# Patient Record
Sex: FEMALE | Race: BLACK OR AFRICAN AMERICAN | NOT HISPANIC OR LATINO | RURAL
[De-identification: names, ages, dates, MRNs, and addresses within clinical notes are randomized per-mention and may not be internally consistent; named-entity substitution may affect disease eponyms.]

---

## 2022-06-30 DIAGNOSIS — M25.521 RIGHT ELBOW PAIN: Primary | ICD-10-CM

## 2022-06-30 DIAGNOSIS — M25.511 RIGHT SHOULDER PAIN, UNSPECIFIED CHRONICITY: ICD-10-CM

## 2022-07-05 ENCOUNTER — HOSPITAL ENCOUNTER (OUTPATIENT)
Dept: RADIOLOGY | Facility: HOSPITAL | Age: 47
Discharge: HOME | End: 2022-07-05
Attending: ORTHOPAEDIC SURGERY
Payer: MEDICAID

## 2022-07-05 ENCOUNTER — OFFICE VISIT (OUTPATIENT)
Dept: ORTHOPEDICS | Facility: CLINIC | Age: 47
End: 2022-07-05
Payer: MEDICAID

## 2022-07-05 DIAGNOSIS — M25.511 RIGHT SHOULDER PAIN, UNSPECIFIED CHRONICITY: ICD-10-CM

## 2022-07-05 DIAGNOSIS — M77.11 LATERAL EPICONDYLITIS OF RIGHT ELBOW: ICD-10-CM

## 2022-07-05 DIAGNOSIS — M65.311 TRIGGER FINGER OF RIGHT THUMB: ICD-10-CM

## 2022-07-05 DIAGNOSIS — M75.40 IMPINGEMENT SYNDROME OF SHOULDER REGION, UNSPECIFIED LATERALITY: Primary | ICD-10-CM

## 2022-07-05 DIAGNOSIS — M25.521 RIGHT ELBOW PAIN: ICD-10-CM

## 2022-07-05 DIAGNOSIS — M75.00 ADHESIVE CAPSULITIS OF SHOULDER, UNSPECIFIED LATERALITY: ICD-10-CM

## 2022-07-05 PROCEDURE — 73070 X-RAY EXAM OF ELBOW: CPT | Mod: 26,RT,, | Performed by: ORTHOPAEDIC SURGERY

## 2022-07-05 PROCEDURE — 73030 XR SHOULDER COMPLETE 2 OR MORE VIEWS RIGHT: ICD-10-PCS | Mod: 26,RT,, | Performed by: ORTHOPAEDIC SURGERY

## 2022-07-05 PROCEDURE — 99204 PR OFFICE/OUTPT VISIT, NEW, LEVL IV, 45-59 MIN: ICD-10-PCS | Mod: 25,,, | Performed by: ORTHOPAEDIC SURGERY

## 2022-07-05 PROCEDURE — 73070 X-RAY EXAM OF ELBOW: CPT | Mod: TC,RT

## 2022-07-05 PROCEDURE — 20550 INTERMEDIATE JOINT ASPIRATION/INJECTION: ICD-10-PCS | Mod: 59,RT,, | Performed by: ORTHOPAEDIC SURGERY

## 2022-07-05 PROCEDURE — 99204 OFFICE O/P NEW MOD 45 MIN: CPT | Mod: 25,,, | Performed by: ORTHOPAEDIC SURGERY

## 2022-07-05 PROCEDURE — 73070 XR ELBOW 2 VIEWS RIGHT: ICD-10-PCS | Mod: 26,RT,, | Performed by: ORTHOPAEDIC SURGERY

## 2022-07-05 PROCEDURE — 20610 LARGE JOINT ASPIRATION/INJECTION: R SUBACROMIAL BURSA: ICD-10-PCS | Mod: RT,,, | Performed by: ORTHOPAEDIC SURGERY

## 2022-07-05 PROCEDURE — 20610 DRAIN/INJ JOINT/BURSA W/O US: CPT | Mod: RT,,, | Performed by: ORTHOPAEDIC SURGERY

## 2022-07-05 PROCEDURE — 20550 NJX 1 TENDON SHEATH/LIGAMENT: CPT | Mod: 59,RT,, | Performed by: ORTHOPAEDIC SURGERY

## 2022-07-05 PROCEDURE — 73030 X-RAY EXAM OF SHOULDER: CPT | Mod: 26,RT,, | Performed by: ORTHOPAEDIC SURGERY

## 2022-07-05 PROCEDURE — 73030 X-RAY EXAM OF SHOULDER: CPT | Mod: TC,RT

## 2022-07-05 RX ORDER — TRIAMCINOLONE ACETONIDE 40 MG/ML
80 INJECTION, SUSPENSION INTRA-ARTICULAR; INTRAMUSCULAR
Status: DISCONTINUED | OUTPATIENT
Start: 2022-07-05 | End: 2022-07-05 | Stop reason: HOSPADM

## 2022-07-05 RX ORDER — TRIAMCINOLONE ACETONIDE 40 MG/ML
40 INJECTION, SUSPENSION INTRA-ARTICULAR; INTRAMUSCULAR
Status: DISCONTINUED | OUTPATIENT
Start: 2022-07-05 | End: 2022-07-05 | Stop reason: HOSPADM

## 2022-07-05 RX ADMIN — TRIAMCINOLONE ACETONIDE 40 MG: 40 INJECTION, SUSPENSION INTRA-ARTICULAR; INTRAMUSCULAR at 09:07

## 2022-07-05 RX ADMIN — TRIAMCINOLONE ACETONIDE 80 MG: 40 INJECTION, SUSPENSION INTRA-ARTICULAR; INTRAMUSCULAR at 09:07

## 2022-07-05 NOTE — PROGRESS NOTES
HPI:   Arti Kaur is a pleasant 46 y.o. patient who reports to clinic for evaluation of right shoulder and right elbow pain. Pt reports she has had pain for 2 months. Reports her pain is anteriorly based. She has pain on the lateral side of her elbow. Hurts to pick anything up.   Injury onset and description: None  Patient's occupation: [employer] (she is a [employer] vet)  This is not a work related injury.   she has not had formal physical therapy  she has not had previous shoulder injections.   she has not had advanced imaging such as MRI.   The shoulder pain worsens with activity and overhead motion. Pain is disruptive to sleep at night. The pain is better with rest. Treatment thus far has included rest and activity modification. Here today to discuss diagnosis and treatment options.   VAS Pain Scale:  7      PAST MEDICAL HISTORY:   No past medical history on file.  PAST SURGICAL HISTORY:   No past surgical history on file.  MEDICATIONS:  No current outpatient medications on file.  ALLERGIES:   Review of patient's allergies indicates:  Not on File  REVIEW OF SYSTEMS:  Constitution: Negative. Negative for chills, fever and night sweats. HENT: Negative for congestion and headaches.  Eyes: Negative for blurred vision, left vision loss and right vision loss. Cardiovascular: Negative for chest pain and syncope. Respiratory: Negative for cough and shortness of breath.  Endocrine: Negative for polydipsia, polyphagia and polyuria. Hematologic/Lymphatic: Negative for bleeding problem. Does not bruise/bleed easily. Skin: Negative for dry skin, itching and rash.   Musculoskeletal: Negative for falls. Positive for hand pain and muscle weakness.     PHYSICAL EXAM:  VITAL SIGNS: There were no vitals taken for this visit.  General: Well-developed well-nourished 46 y.o. femalein no acute distress;Cardiovascular: Regular rhythm by palpation of distal pulse, normal color and temperature, no concerning  varicosities on symptomatic side Lungs: No labored breathing or wheezing appreciated Neuro: Alert and oriented ×3 Psychiatric: well oriented to person, place and time, demonstrates normal mood and affect Skin: No rashes, lesions or ulcers, normal temperature, turgor, and texture on uninvolved extremity    General    Nursing note and vitals reviewed.  Constitutional: She is oriented to person, place, and time. She appears well-developed and well-nourished. No distress.   HENT:   Head: Normocephalic and atraumatic.   Nose: Nose normal.   Eyes: EOM are normal. Pupils are equal, round, and reactive to light.   Neck: Neck supple.   Cardiovascular: Normal rate and intact distal pulses.    Pulmonary/Chest: Effort normal and breath sounds normal. No respiratory distress. She exhibits no tenderness.   Abdominal: Soft. Bowel sounds are normal. She exhibits no distension. There is no abdominal tenderness.   Neurological: She is alert and oriented to person, place, and time. She has normal reflexes. She displays normal reflexes. No cranial nerve deficit. She exhibits normal muscle tone.   Psychiatric: She has a normal mood and affect. Her behavior is normal. Judgment and thought content normal.             Right Hand/Wrist Exam     Inspection   Scars: Wrist - absent     Range of Motion     Wrist   Extension: normal   Flexion: normal     Other     Neuorologic Exam    Median Distribution: normal  Ulnar Distribution: normal  Radial Distribution: normal      Right Elbow Exam     Inspection   Scars: absent  Bruising: absent  Atrophy: absent    Pain   The patient exhibits pain of the extensor musculature and lateral epicondyle    Range of Motion   Extension: normal   Flexion: normal   Pronation: normal   Supination: normal     Tests   Varus: negative  Valgus: negative  Tennis Elbow: severe  Golfer's Elbow: negative  Radial Capitellar Grind: negative    Other   Sensation: normal      Right Shoulder Exam     Inspection/Observation    Swelling: absent  Bruising: absent  Scapular Dyskinesia: positive    Tenderness   The patient is tender to palpation of the greater tuberosity, acromion and acromioclavicular joint.    Range of Motion   Active abduction: abnormal   Passive abduction: abnormal   Forward Flexion: abnormal   Forward Elevation: abnormal  External Rotation 90 degrees: normal    Tests & Signs   Apprehension: negative  Cross arm: positive  Drop arm: negative  Vila test: positive  Impingement: positive  Rotator Cuff Painful Arc/Range: mild  Lag Sign 90 degrees: negative  Lift Off Sign: positive  Belly Press: positive  Active Compression Test (Timblin's Sign): positive  Jerk Test: negative    Other   Sensation: normal    Comments:  Pain with dynamic labral shear test.  There is pain and weakness with Whipple testing.     Left Shoulder Exam   Left shoulder exam is normal.      Muscle Strength   Right Upper Extremity   Shoulder External Rotation: 4/5   Supraspinatus: 4/5   Subscapularis: 4/5   Biceps: 4/5   Wrist extension: 4/5   Wrist flexion: 5/5   Elbow Pronation:  5/5   Elbow Supination:  5/5   Elbow Extension: 5/5  Elbow Flexion: 5/5    Reflexes     Right Side   Biceps:  2+  Right Smith's Sign:  absent    Vascular Exam     Right Pulses      Radial:                    2+      Capillary Refill  Right Hand: normal capillary refill            IMAGING:  X-ray Shoulder 2 or More Views Right  Radiographs the right shoulder obtained today demonstrating mild glenohumeral osteoarthritis and moderate AC joint osteoarthritis with no evidence of fracture dislocation or pathologic bone.    X-Ray Elbow 2 Views Right    Right elbow radiographs obtained today demonstrate no evidence of fracture dislocation or pathologic bone.    ASSESSMENT:      ICD-10-CM ICD-9-CM   1. Impingement syndrome of shoulder region, unspecified laterality  M75.40 726.2   2. Adhesive capsulitis of shoulder, unspecified laterality  M75.00 726.0   3. Lateral epicondylitis of  right elbow  M77.11 726.32   4. Trigger finger of right thumb  M65.311 727.03       PLAN:     -Findings and treatment options were discussed with the patient  -All questions answered    Also has a right trigger thumb thumb.  Recommend voltaren gel OTC  PT right shoulder   RTC in 4 weeks.  If no better, consider MRI of shoulder  There are no Patient Instructions on file for this visit.  No orders of the defined types were placed in this encounter.    R lateral epicondyleIntermediate Joint Aspiration/Injection    Date/Time: 7/5/2022 9:30 AM  Performed by: Jhony Pabon MD  Authorized by: Jhony Pabon MD     Indications: Pain  Site marked: The procedure site was marked      Location:  Elbow  Needle size:  22 G  Medications:  40 mg triamcinolone acetonide 40 mg/mL  Patient tolerance:  Patient tolerated the procedure well with no immediate complications      Large Joint Aspiration/Injection: R subacromial bursa    Date/Time: 7/5/2022 9:30 AM  Performed by: Jhony Pabon MD  Authorized by: Jhony Pabon MD     Consent Done?:  Yes (Verbal)  Indications:  Pain  Site marked: the procedure site was marked    Local anesthetic:  Bupivacaine 0.25% without epinephrine (4 cc's of 0.25% bupivicaine)    Details:  Needle Size:  22 G  Approach:  Posterior  Location:  Shoulder  Site:  R subacromial bursa  Medications:  80 mg triamcinolone acetonide 40 mg/mL  Patient tolerance:  Patient tolerated the procedure well with no immediate complications

## 2022-10-18 ENCOUNTER — OUTSIDE PLACE OF SERVICE (OUTPATIENT)
Dept: CARDIOLOGY | Facility: HOSPITAL | Age: 47
End: 2022-10-18
Payer: MEDICAID

## 2022-10-18 PROCEDURE — 93010 PR ELECTROCARDIOGRAM REPORT: ICD-10-PCS | Mod: ,,, | Performed by: INTERNAL MEDICINE

## 2022-10-18 PROCEDURE — 93010 ELECTROCARDIOGRAM REPORT: CPT | Mod: ,,, | Performed by: INTERNAL MEDICINE

## 2023-07-12 DIAGNOSIS — M77.11 LATERAL EPICONDYLITIS OF RIGHT ELBOW: Primary | ICD-10-CM

## 2023-07-12 DIAGNOSIS — M79.641 RIGHT HAND PAIN: ICD-10-CM

## 2023-07-12 DIAGNOSIS — M25.521 RIGHT ELBOW PAIN: ICD-10-CM

## 2023-07-24 ENCOUNTER — HOSPITAL ENCOUNTER (OUTPATIENT)
Dept: RADIOLOGY | Facility: HOSPITAL | Age: 48
Discharge: HOME | End: 2023-07-24
Attending: NURSE PRACTITIONER
Payer: MEDICAID

## 2023-07-24 ENCOUNTER — OFFICE VISIT (OUTPATIENT)
Dept: ORTHOPEDICS | Facility: CLINIC | Age: 48
End: 2023-07-24
Payer: MEDICAID

## 2023-07-24 VITALS — BODY MASS INDEX: 32.93 KG/M2 | WEIGHT: 230 LBS | HEIGHT: 70 IN

## 2023-07-24 DIAGNOSIS — M65.341 TRIGGER RING FINGER OF RIGHT HAND: ICD-10-CM

## 2023-07-24 DIAGNOSIS — M79.641 RIGHT HAND PAIN: ICD-10-CM

## 2023-07-24 DIAGNOSIS — M25.521 RIGHT ELBOW PAIN: ICD-10-CM

## 2023-07-24 DIAGNOSIS — M65.311 TRIGGER FINGER OF RIGHT THUMB: ICD-10-CM

## 2023-07-24 DIAGNOSIS — M77.11 LATERAL EPICONDYLITIS OF RIGHT ELBOW: Primary | ICD-10-CM

## 2023-07-24 PROCEDURE — 73070 X-RAY EXAM OF ELBOW: CPT | Mod: TC,RT

## 2023-07-24 PROCEDURE — 73130 X-RAY EXAM OF HAND: CPT | Mod: TC,RT

## 2023-07-24 PROCEDURE — 20605 DRAIN/INJ JOINT/BURSA W/O US: CPT | Mod: PBBFAC,RT | Performed by: NURSE PRACTITIONER

## 2023-07-24 PROCEDURE — 73070 XR ELBOW 2 VIEWS RIGHT: ICD-10-PCS | Mod: 26,RT,, | Performed by: RADIOLOGY

## 2023-07-24 PROCEDURE — 73130 X-RAY EXAM OF HAND: CPT | Mod: 26,RT,, | Performed by: RADIOLOGY

## 2023-07-24 PROCEDURE — 99213 OFFICE O/P EST LOW 20 MIN: CPT | Mod: S$PBB,25,, | Performed by: NURSE PRACTITIONER

## 2023-07-24 PROCEDURE — 20605 INTERMEDIATE JOINT ASPIRATION/INJECTION: ICD-10-PCS | Mod: S$PBB,RT,, | Performed by: NURSE PRACTITIONER

## 2023-07-24 PROCEDURE — 3008F BODY MASS INDEX DOCD: CPT | Mod: CPTII,,, | Performed by: NURSE PRACTITIONER

## 2023-07-24 PROCEDURE — 73070 X-RAY EXAM OF ELBOW: CPT | Mod: 26,RT,, | Performed by: RADIOLOGY

## 2023-07-24 PROCEDURE — 99213 OFFICE O/P EST LOW 20 MIN: CPT | Mod: PBBFAC,25 | Performed by: NURSE PRACTITIONER

## 2023-07-24 PROCEDURE — 73130 XR HAND COMPLETE 3 VIEW RIGHT: ICD-10-PCS | Mod: 26,RT,, | Performed by: RADIOLOGY

## 2023-07-24 PROCEDURE — 99213 PR OFFICE/OUTPT VISIT, EST, LEVL III, 20-29 MIN: ICD-10-PCS | Mod: S$PBB,25,, | Performed by: NURSE PRACTITIONER

## 2023-07-24 PROCEDURE — 3008F PR BODY MASS INDEX (BMI) DOCUMENTED: ICD-10-PCS | Mod: CPTII,,, | Performed by: NURSE PRACTITIONER

## 2023-07-24 RX ORDER — METFORMIN HYDROCHLORIDE 500 MG/1
500 TABLET, EXTENDED RELEASE ORAL
COMMUNITY
Start: 2022-04-03

## 2023-07-24 RX ORDER — VENLAFAXINE HYDROCHLORIDE 75 MG/1
1 CAPSULE, EXTENDED RELEASE ORAL DAILY
COMMUNITY
Start: 2022-03-16

## 2023-07-24 RX ORDER — CHOLECALCIFEROL (VITAMIN D3) 25 MCG
50 TABLET ORAL
COMMUNITY
Start: 2022-04-03

## 2023-07-24 RX ORDER — BUPROPION HYDROCHLORIDE 300 MG/1
1 TABLET ORAL DAILY
COMMUNITY
Start: 2022-03-16

## 2023-07-24 RX ORDER — LANOLIN ALCOHOL/MO/W.PET/CERES
1000 CREAM (GRAM) TOPICAL
COMMUNITY
Start: 2022-04-03

## 2023-07-24 RX ORDER — TRIAMCINOLONE ACETONIDE 40 MG/ML
40 INJECTION, SUSPENSION INTRA-ARTICULAR; INTRAMUSCULAR
Status: DISCONTINUED | OUTPATIENT
Start: 2023-07-24 | End: 2023-07-24 | Stop reason: HOSPADM

## 2023-07-24 RX ORDER — PRAZOSIN HYDROCHLORIDE 2 MG/1
2 CAPSULE ORAL NIGHTLY
COMMUNITY
Start: 2022-03-16

## 2023-07-24 RX ORDER — HYDROXYZINE HYDROCHLORIDE 25 MG/1
25 TABLET, FILM COATED ORAL
COMMUNITY
Start: 2022-05-02

## 2023-07-24 RX ORDER — MIRTAZAPINE 30 MG/1
30 TABLET, FILM COATED ORAL
COMMUNITY
Start: 2022-03-16

## 2023-07-24 RX ORDER — METHYLPREDNISOLONE 4 MG/1
TABLET ORAL
Qty: 21 EACH | Refills: 0 | Status: SHIPPED | OUTPATIENT
Start: 2023-07-24 | End: 2023-08-14

## 2023-07-24 RX ORDER — CYCLOSPORINE 0.5 MG/ML
1 EMULSION OPHTHALMIC 2 TIMES DAILY
COMMUNITY
Start: 2023-06-09

## 2023-07-24 RX ORDER — OMEPRAZOLE 20 MG/1
20 CAPSULE, DELAYED RELEASE ORAL
COMMUNITY
Start: 2022-03-26

## 2023-07-24 RX ORDER — BUSPIRONE HYDROCHLORIDE 10 MG/1
20 TABLET ORAL
COMMUNITY
Start: 2022-03-16

## 2023-07-24 RX ADMIN — TRIAMCINOLONE ACETONIDE 40 MG: 40 INJECTION, SUSPENSION INTRA-ARTICULAR; INTRAMUSCULAR at 01:07

## 2023-07-24 NOTE — PROGRESS NOTES
47 y.o. Female returns to clinic for a follow up visit regarding     ICD-10-CM ICD-9-CM   1. Lateral epicondylitis of right elbow  M77.11 726.32        Patient received an injection in her elbow about a year ago. She states the injection relieved her pain for about 4 months.   She is not having pain in her elbow and wrist. She reports that the pain and limited ROM is interfering with her normal daily activities. She is very tender over lateral side of her elbow.  Also complaining of right thumb and ring finger trigger fingers.  Reports she is ready to discuss surgical options at this time.         No past medical history on file.  No past surgical history on file.      PHYSICAL EXAMINATION:    General    Nursing note and vitals reviewed.  Constitutional: She is oriented to person, place, and time. She appears well-developed and well-nourished.   HENT:   Head: Normocephalic and atraumatic.   Nose: Nose normal.   Eyes: EOM are normal. Pupils are equal, round, and reactive to light.   Neck: Neck supple.   Cardiovascular:  Normal rate and intact distal pulses.            Pulmonary/Chest: Effort normal. No respiratory distress. She exhibits no tenderness.   Abdominal: Soft. She exhibits no distension. There is no abdominal tenderness.   Neurological: She is alert and oriented to person, place, and time. She has normal reflexes.   Psychiatric: She has a normal mood and affect. Her behavior is normal. Judgment and thought content normal.             Right Hand/Wrist Exam     Inspection   Scars: Wrist - absent     Range of Motion     Wrist   Extension:  normal   Flexion:  normal     Other     Neuorologic Exam    Median Distribution: normal  Ulnar Distribution: normal  Radial Distribution: normal      Right Elbow Exam     Inspection   Scars: absent  Bruising: absent  Atrophy: absent    Pain   The patient exhibits pain of the extensor musculature and lateral epicondyle    Range of Motion   Extension:  normal   Flexion:   normal   Pronation:  normal   Supination:  normal     Tests   Varus: negative  Valgus: negative  Tennis Elbow: severe  Golfer's Elbow: negative  Radial Capitellar Grind: negative    Other   Sensation: normal          Muscle Strength   Right Upper Extremity   Wrist extension: 4/5   Wrist flexion: 5/5   Elbow Pronation:  5/5   Elbow Supination:  5/5   Elbow Extension: 5/5  Elbow Flexion: 5/5      IMAGING:  X-Ray Elbow 2 Views Right    Result Date: 7/24/2023  EXAMINATION: XR ELBOW 2 VIEWS RIGHT CLINICAL HISTORY: Pain in right elbow COMPARISON: 5 July 2022 TECHNIQUE: XR ELBOW 2 VIEWS RIGHT FINDINGS: No evidence of fracture seen.  The alignment of the joints appears normal.  No degenerative change is present.  No soft tissue abnormality is seen.     No evidence of abnormality demonstrated Electronically signed by: Juwan Rueda Date:    07/24/2023 Time:    13:58    X-Ray Hand 3 View Right    Result Date: 7/24/2023  EXAMINATION: XR HAND COMPLETE 3 VIEW RIGHT CLINICAL HISTORY: Pain in right hand COMPARISON: None available TECHNIQUE: XR HAND 3 VIEW RIGHT FINDINGS: No evidence of fracture seen.  The alignment of the joints appears normal.  Mild diffuse wrist and hand degenerative change is present.  No soft tissue abnormality is seen.     Mild osteoarthrosis. Electronically signed by: Juwan Rueda Date:    07/24/2023 Time:    13:59       ASSESSMENT:      ICD-10-CM ICD-9-CM   1. Lateral epicondylitis of right elbow  M77.11 726.32       PLAN:     -Findings and treatment options were discussed with the patient  -All questions answered    Elbow injection today.  Medrol Dosepak.  Will have her return to clinic with Dr. Booker in 2-3 weeks to discuss trigger finger release right thumb and ring fingers.    There are no Patient Instructions on file for this visit.      No orders of the defined types were placed in this encounter.        R lateral epicondyleIntermediate Joint Aspiration/Injection    Date/Time: 7/24/2023 1:40  PM  Performed by: TAMAR Buenrostro  Authorized by: TAMAR Buenrostro     Indications:  Pain  Site marked: The procedure site was marked      Location:  Elbow  Needle size:  22 G  Medications:  40 mg triamcinolone acetonide 40 mg/mL  Patient tolerance:  Patient tolerated the procedure well with no immediate complications

## 2023-08-10 ENCOUNTER — OFFICE VISIT (OUTPATIENT)
Dept: ORTHOPEDICS | Facility: CLINIC | Age: 48
End: 2023-08-10
Payer: MEDICAID

## 2023-08-10 DIAGNOSIS — M77.11 LATERAL EPICONDYLITIS OF RIGHT ELBOW: Primary | ICD-10-CM

## 2023-08-10 DIAGNOSIS — M65.311 TRIGGER FINGER OF RIGHT THUMB: ICD-10-CM

## 2023-08-10 DIAGNOSIS — M65.341 TRIGGER RING FINGER OF RIGHT HAND: ICD-10-CM

## 2023-08-10 DIAGNOSIS — M25.529 ELBOW PAIN, UNSPECIFIED LATERALITY: ICD-10-CM

## 2023-08-10 PROCEDURE — 99213 OFFICE O/P EST LOW 20 MIN: CPT | Mod: S$PBB,,, | Performed by: ORTHOPAEDIC SURGERY

## 2023-08-10 PROCEDURE — 99212 OFFICE O/P EST SF 10 MIN: CPT | Mod: PBBFAC | Performed by: ORTHOPAEDIC SURGERY

## 2023-08-10 PROCEDURE — 99213 PR OFFICE/OUTPT VISIT, EST, LEVL III, 20-29 MIN: ICD-10-PCS | Mod: S$PBB,,, | Performed by: ORTHOPAEDIC SURGERY

## 2023-08-10 RX ORDER — DIVALPROEX SODIUM 250 MG/1
3 TABLET, FILM COATED, EXTENDED RELEASE ORAL NIGHTLY
COMMUNITY
Start: 2022-11-30 | End: 2023-12-01

## 2023-08-10 NOTE — PROGRESS NOTES
ASSESSMENT:      ICD-10-CM ICD-9-CM   1. Lateral epicondylitis of right elbow  M77.11 726.32   2. Trigger finger of right thumb  M65.311 727.03   3. Trigger ring finger of right hand  M65.341 727.03       PLAN:     -Findings and treatment options were discussed with the patient  -All questions answered  ***    There are no Patient Instructions on file for this visit.    IMAGING:  X-Ray Hand 3 View Right    Result Date: 7/24/2023  EXAMINATION: XR HAND COMPLETE 3 VIEW RIGHT CLINICAL HISTORY: Pain in right hand COMPARISON: None available TECHNIQUE: XR HAND 3 VIEW RIGHT FINDINGS: No evidence of fracture seen.  The alignment of the joints appears normal.  Mild diffuse wrist and hand degenerative change is present.  No soft tissue abnormality is seen.     Mild osteoarthrosis. Electronically signed by: Juwan Rueda Date:    07/24/2023 Time:    13:59    X-Ray Elbow 2 Views Right    Result Date: 7/24/2023  EXAMINATION: XR ELBOW 2 VIEWS RIGHT CLINICAL HISTORY: Pain in right elbow COMPARISON: 5 July 2022 TECHNIQUE: XR ELBOW 2 VIEWS RIGHT FINDINGS: No evidence of fracture seen.  The alignment of the joints appears normal.  No degenerative change is present.  No soft tissue abnormality is seen.     No evidence of abnormality demonstrated Electronically signed by: Juwan Rueda Date:    07/24/2023 Time:    13:58   ***                  CC:  Hand/wrist pain    47 y.o. Female returns to clinic for a follow up visit regarding her right tennis elbow and trigger fingers. She was seen and given an injection by Gretchen Garrido for her tennis elbow. She got about a week's worth of relief from the injection and the steroid pack. Her right thumb and her right index finger lock and catch daily.           REVIEW OF SYSTEMS:   Constitution: Negative. Negative for chills, fever and night sweats.    Hematologic/Lymphatic: Negative for bleeding problem. Does not bruise/bleed easily.   Skin: Negative for dry skin, itching and rash.    Musculoskeletal: Negative for falls. Positive for hand/wrist pain and muscle weakness.     All other review of symptoms were reviewed and found to be noncontributory.     PAST MEDICAL HISTORY:   Past Medical History:   Diagnosis Date    Anxiety disorder, unspecified     Hypertension     Major depressive disorder, single episode, unspecified        PAST SURGICAL HISTORY:   Past Surgical History:   Procedure Laterality Date    APPENDECTOMY N/A     HEEL SPUR SURGERY Right     LAPAROSCOPIC CHOLECYSTECTOMY N/A     REPAIR OF URETER      TOTAL ABDOMINAL HYSTERECTOMY W/ BILATERAL SALPINGOOPHORECTOMY         FAMILY HISTORY:   Family History   Problem Relation Age of Onset    Diabetes Father     Hypertension Father     Heart disease Father        SOCIAL HISTORY:   Social History     Socioeconomic History    Marital status: Single   Tobacco Use    Smoking status: Never    Smokeless tobacco: Never   Substance and Sexual Activity    Alcohol use: Yes     Comment: occasional       MEDICATIONS:     Current Outpatient Medications:     divalproex ER (DEPAKOTE ER) 250 MG 24 hr tablet, Take 3 tablets by mouth every evening., Disp: , Rfl:     buPROPion (WELLBUTRIN XL) 300 MG 24 hr tablet, Take 1 tablet by mouth once daily., Disp: , Rfl:     busPIRone (BUSPAR) 10 MG tablet, Take 20 mg by mouth., Disp: , Rfl:     cyanocobalamin (VITAMIN B-12) 1000 MCG tablet, Take 1,000 mcg by mouth., Disp: , Rfl:     hydrOXYzine HCL (ATARAX) 25 MG tablet, Take 25 mg by mouth., Disp: , Rfl:     metFORMIN (GLUCOPHAGE-XR) 500 MG ER 24hr tablet, Take 500 mg by mouth., Disp: , Rfl:     methylPREDNISolone (MEDROL DOSEPACK) 4 mg tablet, use as directed, Disp: 21 each, Rfl: 0    mirtazapine (REMERON) 30 MG tablet, Take 30 mg by mouth., Disp: , Rfl:     omeprazole (PRILOSEC) 20 MG capsule, Take 20 mg by mouth., Disp: , Rfl:     prazosin (MINIPRESS) 2 MG Cap, Take 2 capsules by mouth nightly., Disp: , Rfl:     RESTASIS 0.05 % ophthalmic emulsion, Place 1 drop  into both eyes 2 (two) times daily., Disp: , Rfl:     venlafaxine (EFFEXOR-XR) 75 MG 24 hr capsule, Take 1 capsule by mouth once daily., Disp: , Rfl:     vitamin D (VITAMIN D3) 1000 units Tab, Take 50 mcg by mouth., Disp: , Rfl:     ALLERGIES:   Review of patient's allergies indicates:   Allergen Reactions    Aspirin         PHYSICAL EXAMINATION:  There were no vitals taken for this visit.  Ortho/SPM Exam        No orders of the defined types were placed in this encounter.      Procedures

## 2023-08-10 NOTE — PROGRESS NOTES
47 y.o. Female returns to clinic for a follow up visit regarding     ICD-10-CM ICD-9-CM   1. Lateral epicondylitis of right elbow  M77.11 726.32   2. Trigger finger of right thumb  M65.311 727.03   3. Trigger ring finger of right hand  M65.341 727.03        States that her injections in her elbow only lasted about a week a piece.  Continues to complain of debilitating triggering involving the ring finger of her right hand.  She is tried topical Voltaren and anti-inflammatories limited success       Past Medical History:   Diagnosis Date    Anxiety disorder, unspecified     Hypertension     Major depressive disorder, single episode, unspecified      Past Surgical History:   Procedure Laterality Date    APPENDECTOMY N/A     HEEL SPUR SURGERY Right     LAPAROSCOPIC CHOLECYSTECTOMY N/A     REPAIR OF URETER      TOTAL ABDOMINAL HYSTERECTOMY W/ BILATERAL SALPINGOOPHORECTOMY           PHYSICAL EXAMINATION:    General    Nursing note and vitals reviewed.  Constitutional: She is oriented to person, place, and time. She appears well-developed and well-nourished.   HENT:   Head: Normocephalic and atraumatic.   Nose: Nose normal.   Eyes: EOM are normal. Pupils are equal, round, and reactive to light.   Neck: Neck supple.   Cardiovascular:  Normal rate and intact distal pulses.            Pulmonary/Chest: Effort normal. No respiratory distress. She exhibits no tenderness.   Abdominal: Soft. She exhibits no distension. There is no abdominal tenderness.   Neurological: She is alert and oriented to person, place, and time. She has normal reflexes.   Psychiatric: She has a normal mood and affect. Her behavior is normal. Judgment and thought content normal.             Right Hand/Wrist Exam     Inspection   Scars: Wrist - absent     Range of Motion     Wrist   Extension:  normal   Flexion:  normal     Other     Neuorologic Exam    Median Distribution: normal  Ulnar Distribution: normal  Radial Distribution: normal      Right Elbow  Exam     Inspection   Scars: absent  Bruising: absent  Atrophy: absent    Pain   The patient exhibits pain of the extensor musculature and lateral epicondyle    Range of Motion   Extension:  normal   Flexion:  normal   Pronation:  normal   Supination:  normal     Tests   Varus: negative  Valgus: negative  Tennis Elbow: severe  Golfer's Elbow: negative  Radial Capitellar Grind: negative    Other   Sensation: normal          Muscle Strength   Right Upper Extremity   Wrist extension: 4/5   Wrist flexion: 5/5   Elbow Pronation:  5/5   Elbow Supination:  5/5   Elbow Extension: 5/5  Elbow Flexion: 5/5    The right hand was inspected today there is point tenderness of the A1 pulley of the thumb with there was no active triggering seen.  There is active triggering however of the ring finger and tenderness to palpation of the A1 pulley there.  Slight triggering is also seen at the ring finger.  Exquisite tenderness once again present at the lateral epicondyle.    IMAGING:  X-Ray Hand 3 View Right    Result Date: 7/24/2023  EXAMINATION: XR HAND COMPLETE 3 VIEW RIGHT CLINICAL HISTORY: Pain in right hand COMPARISON: None available TECHNIQUE: XR HAND 3 VIEW RIGHT FINDINGS: No evidence of fracture seen.  The alignment of the joints appears normal.  Mild diffuse wrist and hand degenerative change is present.  No soft tissue abnormality is seen.     Mild osteoarthrosis. Electronically signed by: Juwan Rueda Date:    07/24/2023 Time:    13:59    X-Ray Elbow 2 Views Right    Result Date: 7/24/2023  EXAMINATION: XR ELBOW 2 VIEWS RIGHT CLINICAL HISTORY: Pain in right elbow COMPARISON: 5 July 2022 TECHNIQUE: XR ELBOW 2 VIEWS RIGHT FINDINGS: No evidence of fracture seen.  The alignment of the joints appears normal.  No degenerative change is present.  No soft tissue abnormality is seen.     No evidence of abnormality demonstrated Electronically signed by: Juwan Rueda Date:    07/24/2023 Time:    13:58       ASSESSMENT:       ICD-10-CM ICD-9-CM   1. Lateral epicondylitis of right elbow  M77.11 726.32   2. Trigger finger of right thumb  M65.311 727.03   3. Trigger ring finger of right hand  M65.341 727.03       PLAN:     -Findings and treatment options were discussed with the patient  -All questions answered      She is now had 2 injections along the right epicondyle and has seen improvement but it only lasted about a week.  Concern here for a tear of the extensor carpi radialis brevis tendon so I am going to order an MRI now given her failure to improve with conservative measures and rest.  Could also be a candidate for trigger finger release but let us get the MRI 1st before we schedule any surgical intervention.    There are no Patient Instructions on file for this visit.      No orders of the defined types were placed in this encounter.        Procedures

## 2023-08-30 ENCOUNTER — TELEPHONE (OUTPATIENT)
Dept: ORTHOPEDICS | Facility: CLINIC | Age: 48
End: 2023-08-30
Payer: MEDICAID

## 2023-08-30 NOTE — TELEPHONE ENCOUNTER
PATIENT NOTIFIED OF RESULTS; FOLLOW-UP APPOINTMENT SCHEDULED    ----- Message from Daily Odonnell sent at 8/30/2023  2:31 PM CDT -----  Pt wants MRI results from last week. Please call [phone].

## 2023-09-12 ENCOUNTER — OFFICE VISIT (OUTPATIENT)
Dept: ORTHOPEDICS | Facility: CLINIC | Age: 48
End: 2023-09-12
Payer: MEDICAID

## 2023-09-12 DIAGNOSIS — Z01.818 PREPROCEDURAL EXAMINATION: ICD-10-CM

## 2023-09-12 DIAGNOSIS — M77.11 LATERAL EPICONDYLITIS OF RIGHT ELBOW: Primary | ICD-10-CM

## 2023-09-12 PROCEDURE — 1159F MED LIST DOCD IN RCRD: CPT | Mod: CPTII,,, | Performed by: ORTHOPAEDIC SURGERY

## 2023-09-12 PROCEDURE — 99204 OFFICE O/P NEW MOD 45 MIN: CPT | Mod: S$PBB,,, | Performed by: ORTHOPAEDIC SURGERY

## 2023-09-12 PROCEDURE — 99204 PR OFFICE/OUTPT VISIT, NEW, LEVL IV, 45-59 MIN: ICD-10-PCS | Mod: S$PBB,,, | Performed by: ORTHOPAEDIC SURGERY

## 2023-09-12 PROCEDURE — 99213 OFFICE O/P EST LOW 20 MIN: CPT | Mod: PBBFAC | Performed by: ORTHOPAEDIC SURGERY

## 2023-09-12 PROCEDURE — 1160F PR REVIEW ALL MEDS BY PRESCRIBER/CLIN PHARMACIST DOCUMENTED: ICD-10-PCS | Mod: CPTII,,, | Performed by: ORTHOPAEDIC SURGERY

## 2023-09-12 PROCEDURE — 1160F RVW MEDS BY RX/DR IN RCRD: CPT | Mod: CPTII,,, | Performed by: ORTHOPAEDIC SURGERY

## 2023-09-12 PROCEDURE — 1159F PR MEDICATION LIST DOCUMENTED IN MEDICAL RECORD: ICD-10-PCS | Mod: CPTII,,, | Performed by: ORTHOPAEDIC SURGERY

## 2023-09-12 RX ORDER — METOPROLOL TARTRATE 50 MG/1
50 TABLET ORAL 2 TIMES DAILY
COMMUNITY

## 2023-09-12 RX ORDER — SIMVASTATIN 20 MG/1
10 TABLET, FILM COATED ORAL NIGHTLY
COMMUNITY

## 2023-09-12 NOTE — PATIENT INSTRUCTIONS
Your surgery is scheduled for 9-29 at [facility] in [place].    Labwork (1st floor clinic) ______  EKG      (2nd floor clinic)______      Our office will contact you the day before surgery with your arrival time.  Do not eat or drink anything after midnight the night before surgery (this includes gum, candy, chewing tobacco, etc).  Bring all medication in their original bottles.  Bathe with Hibiclens the night or morning before your surgery.  The morning of your surgery ONLY take blood pressure, heart, acid reflux, or thyroid (if you take a morning dose) medication.  Take these medications with a sip of water.   Be sure to have stopped your blood thinner medication at the appropriate time, as instructed.  Bring your C-Pap machine if you have one.  All jewelry, piercings, or false eyelashes MUST be removed prior to surgery.

## 2023-09-12 NOTE — H&P (VIEW-ONLY)
48 y.o. Female returns to clinic for a follow up visit regarding     ICD-10-CM ICD-9-CM   1. Lateral epicondylitis of right elbow  M77.11 726.32        She is here to discuss her MRI results.       Past Medical History:   Diagnosis Date    Anxiety disorder, unspecified     Hypertension     Major depressive disorder, single episode, unspecified      Past Surgical History:   Procedure Laterality Date    APPENDECTOMY N/A     HEEL SPUR SURGERY Right     LAPAROSCOPIC CHOLECYSTECTOMY N/A     REPAIR OF URETER      TOTAL ABDOMINAL HYSTERECTOMY W/ BILATERAL SALPINGOOPHORECTOMY           PHYSICAL EXAMINATION:    General    Nursing note and vitals reviewed.  Constitutional: She is oriented to person, place, and time. She appears well-developed and well-nourished.   HENT:   Head: Normocephalic and atraumatic.   Nose: Nose normal.   Eyes: EOM are normal. Pupils are equal, round, and reactive to light.   Neck: Neck supple.   Cardiovascular:  Normal rate and intact distal pulses.            Pulmonary/Chest: Effort normal. No respiratory distress. She exhibits no tenderness.   Abdominal: Soft. She exhibits no distension. There is no abdominal tenderness.   Neurological: She is alert and oriented to person, place, and time. She has normal reflexes.   Psychiatric: She has a normal mood and affect. Her behavior is normal. Judgment and thought content normal.             Right Hand/Wrist Exam     Inspection   Scars: Wrist - absent     Range of Motion     Wrist   Extension:  normal   Flexion:  normal     Other     Neuorologic Exam    Median Distribution: normal  Ulnar Distribution: normal  Radial Distribution: normal      Right Elbow Exam     Inspection   Scars: absent  Bruising: absent  Atrophy: absent    Pain   The patient exhibits pain of the extensor musculature and lateral epicondyle    Range of Motion   Extension:  normal   Flexion:  normal   Pronation:  normal   Supination:  normal     Tests   Varus: negative  Valgus:  negative  Tennis Elbow: severe  Golfer's Elbow: negative  Radial Capitellar Grind: negative    Other   Sensation: normal          Muscle Strength   Right Upper Extremity   Wrist extension: 4/5   Wrist flexion: 5/5   Elbow Pronation:  5/5   Elbow Supination:  5/5   Elbow Extension: 5/5  Elbow Flexion: 5/5        IMAGING:  MRI Elbow Joint Without Contrast Right    Result Date: 8/24/2023  EXAMINATION: MRI ELBOW WITHOUT CONTRAST RIGHT CLINICAL HISTORY: Elbow pain, chronic, bursitis suspected, nondiagnostic xray;lateral epicondylitis\; Pain in unspecified elbow TECHNIQUE: Sagittal, axial and coronal imaging of the right elbow was performed using T1, T2, STIR and gradient sequence. COMPARISON: None available FINDINGS: Joint alignment is normal. No joint effusion is seen. No abnormal osseous marrow signal is present. There is increased signal with partial tearing of the common extensor tendon at its origin on the lateral epicondyle.  The underlying ligament appears intact the remaining muscles, tendons and ligaments are intact without signal abnormality. No abnormal fluid collections or other signal abnormality is seen in the soft tissues. No other abnormalities are demonstrated.     Partial tearing common extensor tendon at the lateral epicondyle. Electronically signed by: Juwan Rueda Date:    08/24/2023 Time:    15:24       ASSESSMENT:      ICD-10-CM ICD-9-CM   1. Lateral epicondylitis of right elbow  M77.11 726.32       PLAN:     -Findings and treatment options were discussed with the patient  -All questions answered      Has failed 2 previous injections in the right elbow.  Pain is persistent.  Does have a distinct tear seen of the extensor carpi ready Allis brevis tendon.  We tried nonsurgical treatment options including a stretching program eccentric stretching.  Rest activity modification anti-inflammatories and 2 injections.  She is failed to improve drastically.  She is been dealing with this for now year.  I  think is reasonable consider surgical intervention.  Will plan for open debridement of the lateral epicondylitis with possible repair.  Risks benefits alternatives were discussed.  Patient voiced her understanding.  Will plan for surgical intervention in the near future.    There are no Patient Instructions on file for this visit.      No orders of the defined types were placed in this encounter.        Procedures

## 2023-09-15 DIAGNOSIS — M77.11 LATERAL EPICONDYLITIS, RIGHT ELBOW: ICD-10-CM

## 2023-09-15 DIAGNOSIS — M77.11 LATERAL EPICONDYLITIS OF RIGHT ELBOW: Primary | ICD-10-CM

## 2023-09-15 RX ORDER — MUPIROCIN 20 MG/G
OINTMENT TOPICAL
Status: CANCELLED | OUTPATIENT
Start: 2023-09-15

## 2023-09-15 RX ORDER — SODIUM CHLORIDE 9 MG/ML
INJECTION, SOLUTION INTRAVENOUS CONTINUOUS
Status: CANCELLED | OUTPATIENT
Start: 2023-09-15

## 2023-09-25 ENCOUNTER — CLINICAL SUPPORT (OUTPATIENT)
Dept: CARDIOLOGY | Facility: CLINIC | Age: 48
End: 2023-09-25
Payer: MEDICAID

## 2023-09-25 DIAGNOSIS — Z01.818 PREPROCEDURAL EXAMINATION: ICD-10-CM

## 2023-09-25 PROCEDURE — 93010 ELECTROCARDIOGRAM REPORT: CPT | Mod: S$PBB,,, | Performed by: STUDENT IN AN ORGANIZED HEALTH CARE EDUCATION/TRAINING PROGRAM

## 2023-09-25 PROCEDURE — 99212 OFFICE O/P EST SF 10 MIN: CPT | Mod: PBBFAC

## 2023-09-25 PROCEDURE — 93005 ELECTROCARDIOGRAM TRACING: CPT | Mod: PBBFAC | Performed by: STUDENT IN AN ORGANIZED HEALTH CARE EDUCATION/TRAINING PROGRAM

## 2023-09-25 PROCEDURE — 93010 EKG 12-LEAD: ICD-10-PCS | Mod: S$PBB,,, | Performed by: STUDENT IN AN ORGANIZED HEALTH CARE EDUCATION/TRAINING PROGRAM

## 2023-09-29 ENCOUNTER — ANESTHESIA (OUTPATIENT)
Dept: SURGERY | Facility: HOSPITAL | Age: 48
End: 2023-09-29
Payer: MEDICAID

## 2023-09-29 ENCOUNTER — HOSPITAL ENCOUNTER (OUTPATIENT)
Facility: HOSPITAL | Age: 48
Discharge: HOME | End: 2023-09-29
Attending: ORTHOPAEDIC SURGERY | Admitting: ORTHOPAEDIC SURGERY
Payer: MEDICAID

## 2023-09-29 ENCOUNTER — ANESTHESIA EVENT (OUTPATIENT)
Dept: SURGERY | Facility: HOSPITAL | Age: 48
End: 2023-09-29
Payer: MEDICAID

## 2023-09-29 VITALS
HEIGHT: 70 IN | WEIGHT: 235 LBS | TEMPERATURE: 98 F | BODY MASS INDEX: 33.64 KG/M2 | RESPIRATION RATE: 16 BRPM | DIASTOLIC BLOOD PRESSURE: 95 MMHG | SYSTOLIC BLOOD PRESSURE: 155 MMHG | OXYGEN SATURATION: 97 % | HEART RATE: 97 BPM

## 2023-09-29 DIAGNOSIS — M77.11 LATERAL EPICONDYLITIS OF RIGHT ELBOW: Primary | ICD-10-CM

## 2023-09-29 DIAGNOSIS — M77.11 LATERAL EPICONDYLITIS, RIGHT ELBOW: ICD-10-CM

## 2023-09-29 PROCEDURE — 71000033 HC RECOVERY, INTIAL HOUR: Performed by: ORTHOPAEDIC SURGERY

## 2023-09-29 PROCEDURE — 27000510 HC BLANKET BAIR HUGGER ANY SIZE: Performed by: ANESTHESIOLOGY

## 2023-09-29 PROCEDURE — C1713 ANCHOR/SCREW BN/BN,TIS/BN: HCPCS | Performed by: ORTHOPAEDIC SURGERY

## 2023-09-29 PROCEDURE — 26055 PR INCISE FINGER TENDON SHEATH: ICD-10-PCS | Mod: 51,F8,, | Performed by: ORTHOPAEDIC SURGERY

## 2023-09-29 PROCEDURE — 71000016 HC POSTOP RECOV ADDL HR: Performed by: ORTHOPAEDIC SURGERY

## 2023-09-29 PROCEDURE — D9220A PRA ANESTHESIA: ICD-10-PCS | Mod: ANES,,, | Performed by: ANESTHESIOLOGY

## 2023-09-29 PROCEDURE — D9220A PRA ANESTHESIA: Mod: ANES,,, | Performed by: ANESTHESIOLOGY

## 2023-09-29 PROCEDURE — 24359 PR TENOTOMY ELBOW LATERAL/MEDIAL DEBRIDE REPAIR: ICD-10-PCS | Mod: RT,,, | Performed by: ORTHOPAEDIC SURGERY

## 2023-09-29 PROCEDURE — 36000708 HC OR TIME LEV III 1ST 15 MIN: Performed by: ORTHOPAEDIC SURGERY

## 2023-09-29 PROCEDURE — 24359 REPAIR ELBOW DEB/ATTCH OPEN: CPT | Mod: RT,,, | Performed by: ORTHOPAEDIC SURGERY

## 2023-09-29 PROCEDURE — 27000716 HC OXISENSOR PROBE, ANY SIZE: Performed by: ANESTHESIOLOGY

## 2023-09-29 PROCEDURE — 25000003 PHARM REV CODE 250: Performed by: NURSE ANESTHETIST, CERTIFIED REGISTERED

## 2023-09-29 PROCEDURE — 25000003 PHARM REV CODE 250: Performed by: ORTHOPAEDIC SURGERY

## 2023-09-29 PROCEDURE — 71000015 HC POSTOP RECOV 1ST HR: Performed by: ORTHOPAEDIC SURGERY

## 2023-09-29 PROCEDURE — 63600175 PHARM REV CODE 636 W HCPCS: Performed by: NURSE ANESTHETIST, CERTIFIED REGISTERED

## 2023-09-29 PROCEDURE — 37000009 HC ANESTHESIA EA ADD 15 MINS: Performed by: ORTHOPAEDIC SURGERY

## 2023-09-29 PROCEDURE — 36000709 HC OR TIME LEV III EA ADD 15 MIN: Performed by: ORTHOPAEDIC SURGERY

## 2023-09-29 PROCEDURE — 26055 INCISE FINGER TENDON SHEATH: CPT | Mod: 51,F8,, | Performed by: ORTHOPAEDIC SURGERY

## 2023-09-29 PROCEDURE — 63600175 PHARM REV CODE 636 W HCPCS: Performed by: ANESTHESIOLOGY

## 2023-09-29 PROCEDURE — 27201423 OPTIME MED/SURG SUP & DEVICES STERILE SUPPLY: Performed by: ORTHOPAEDIC SURGERY

## 2023-09-29 PROCEDURE — D9220A PRA ANESTHESIA: Mod: CRNA,,, | Performed by: NURSE ANESTHETIST, CERTIFIED REGISTERED

## 2023-09-29 PROCEDURE — 37000008 HC ANESTHESIA 1ST 15 MINUTES: Performed by: ORTHOPAEDIC SURGERY

## 2023-09-29 PROCEDURE — D9220A PRA ANESTHESIA: ICD-10-PCS | Mod: CRNA,,, | Performed by: NURSE ANESTHETIST, CERTIFIED REGISTERED

## 2023-09-29 PROCEDURE — 27000177 HC AIRWAY, LARYNGEAL MASK: Performed by: ANESTHESIOLOGY

## 2023-09-29 RX ORDER — MEPERIDINE HYDROCHLORIDE 25 MG/ML
25 INJECTION INTRAMUSCULAR; INTRAVENOUS; SUBCUTANEOUS EVERY 10 MIN PRN
Status: DISCONTINUED | OUTPATIENT
Start: 2023-09-29 | End: 2023-09-29 | Stop reason: HOSPADM

## 2023-09-29 RX ORDER — ONDANSETRON 2 MG/ML
INJECTION INTRAMUSCULAR; INTRAVENOUS
Status: DISCONTINUED | OUTPATIENT
Start: 2023-09-29 | End: 2023-09-29

## 2023-09-29 RX ORDER — OXYCODONE AND ACETAMINOPHEN 5; 325 MG/1; MG/1
1 TABLET ORAL EVERY 4 HOURS PRN
Status: DISCONTINUED | OUTPATIENT
Start: 2023-09-29 | End: 2023-09-29 | Stop reason: HOSPADM

## 2023-09-29 RX ORDER — MUPIROCIN 20 MG/G
OINTMENT TOPICAL
Status: DISCONTINUED | OUTPATIENT
Start: 2023-09-29 | End: 2023-09-29 | Stop reason: HOSPADM

## 2023-09-29 RX ORDER — ONDANSETRON 2 MG/ML
4 INJECTION INTRAMUSCULAR; INTRAVENOUS DAILY PRN
Status: DISCONTINUED | OUTPATIENT
Start: 2023-09-29 | End: 2023-09-29 | Stop reason: HOSPADM

## 2023-09-29 RX ORDER — OXYCODONE HYDROCHLORIDE 5 MG/1
10 TABLET ORAL EVERY 4 HOURS PRN
Status: DISCONTINUED | OUTPATIENT
Start: 2023-09-29 | End: 2023-09-29 | Stop reason: HOSPADM

## 2023-09-29 RX ORDER — LIDOCAINE HYDROCHLORIDE 20 MG/ML
INJECTION, SOLUTION EPIDURAL; INFILTRATION; INTRACAUDAL; PERINEURAL
Status: DISCONTINUED | OUTPATIENT
Start: 2023-09-29 | End: 2023-09-29

## 2023-09-29 RX ORDER — PROPOFOL 10 MG/ML
VIAL (ML) INTRAVENOUS
Status: DISCONTINUED | OUTPATIENT
Start: 2023-09-29 | End: 2023-09-29

## 2023-09-29 RX ORDER — OXYCODONE AND ACETAMINOPHEN 10; 325 MG/1; MG/1
1 TABLET ORAL EVERY 6 HOURS PRN
Qty: 30 TABLET | Refills: 0 | Status: SHIPPED | OUTPATIENT
Start: 2023-09-29 | End: 2023-10-12 | Stop reason: SDUPTHER

## 2023-09-29 RX ORDER — SODIUM CHLORIDE 9 MG/ML
INJECTION, SOLUTION INTRAVENOUS CONTINUOUS
Status: DISCONTINUED | OUTPATIENT
Start: 2023-09-29 | End: 2023-09-29 | Stop reason: HOSPADM

## 2023-09-29 RX ORDER — MORPHINE SULFATE 10 MG/ML
4 INJECTION INTRAMUSCULAR; INTRAVENOUS; SUBCUTANEOUS EVERY 5 MIN PRN
Status: DISCONTINUED | OUTPATIENT
Start: 2023-09-29 | End: 2023-09-29 | Stop reason: HOSPADM

## 2023-09-29 RX ORDER — PHENYLEPHRINE HYDROCHLORIDE 10 MG/ML
INJECTION INTRAVENOUS
Status: DISCONTINUED | OUTPATIENT
Start: 2023-09-29 | End: 2023-09-29

## 2023-09-29 RX ORDER — FENTANYL CITRATE 50 UG/ML
INJECTION, SOLUTION INTRAMUSCULAR; INTRAVENOUS
Status: DISCONTINUED | OUTPATIENT
Start: 2023-09-29 | End: 2023-09-29

## 2023-09-29 RX ORDER — ONDANSETRON 4 MG/1
8 TABLET, ORALLY DISINTEGRATING ORAL EVERY 8 HOURS PRN
Status: DISCONTINUED | OUTPATIENT
Start: 2023-09-29 | End: 2023-09-29 | Stop reason: HOSPADM

## 2023-09-29 RX ORDER — CEFAZOLIN SODIUM 1 G/3ML
INJECTION, POWDER, FOR SOLUTION INTRAMUSCULAR; INTRAVENOUS
Status: DISCONTINUED | OUTPATIENT
Start: 2023-09-29 | End: 2023-09-29

## 2023-09-29 RX ORDER — ONDANSETRON 4 MG/1
4 TABLET, ORALLY DISINTEGRATING ORAL EVERY 6 HOURS PRN
Qty: 30 TABLET | Refills: 0 | Status: SHIPPED | OUTPATIENT
Start: 2023-09-29 | End: 2024-04-02

## 2023-09-29 RX ORDER — MIDAZOLAM HYDROCHLORIDE 1 MG/ML
INJECTION INTRAMUSCULAR; INTRAVENOUS
Status: DISCONTINUED | OUTPATIENT
Start: 2023-09-29 | End: 2023-09-29

## 2023-09-29 RX ORDER — DIPHENHYDRAMINE HYDROCHLORIDE 50 MG/ML
25 INJECTION INTRAMUSCULAR; INTRAVENOUS EVERY 6 HOURS PRN
Status: DISCONTINUED | OUTPATIENT
Start: 2023-09-29 | End: 2023-09-29 | Stop reason: HOSPADM

## 2023-09-29 RX ORDER — HYDROMORPHONE HYDROCHLORIDE 2 MG/ML
0.5 INJECTION, SOLUTION INTRAMUSCULAR; INTRAVENOUS; SUBCUTANEOUS EVERY 5 MIN PRN
Status: DISCONTINUED | OUTPATIENT
Start: 2023-09-29 | End: 2023-09-29 | Stop reason: HOSPADM

## 2023-09-29 RX ADMIN — FENTANYL CITRATE 100 MCG: 50 INJECTION INTRAMUSCULAR; INTRAVENOUS at 07:09

## 2023-09-29 RX ADMIN — MORPHINE SULFATE 2 MG: 10 INJECTION, SOLUTION INTRAMUSCULAR; INTRAVENOUS at 09:09

## 2023-09-29 RX ADMIN — HYDROMORPHONE HYDROCHLORIDE 0.5 MG: 2 INJECTION, SOLUTION INTRAMUSCULAR; INTRAVENOUS; SUBCUTANEOUS at 09:09

## 2023-09-29 RX ADMIN — MORPHINE SULFATE 4 MG: 10 INJECTION, SOLUTION INTRAMUSCULAR; INTRAVENOUS at 09:09

## 2023-09-29 RX ADMIN — SODIUM CHLORIDE: 9 INJECTION, SOLUTION INTRAVENOUS at 06:09

## 2023-09-29 RX ADMIN — OXYCODONE HYDROCHLORIDE AND ACETAMINOPHEN 1 TABLET: 5; 325 TABLET ORAL at 09:09

## 2023-09-29 RX ADMIN — LIDOCAINE HYDROCHLORIDE 100 MG: 20 INJECTION, SOLUTION INTRAVENOUS at 07:09

## 2023-09-29 RX ADMIN — PHENYLEPHRINE HYDROCHLORIDE 100 MCG: 10 INJECTION INTRAVENOUS at 08:09

## 2023-09-29 RX ADMIN — ONDANSETRON 8 MG: 2 INJECTION INTRAMUSCULAR; INTRAVENOUS at 07:09

## 2023-09-29 RX ADMIN — CEFAZOLIN 2 G: 1 INJECTION, POWDER, FOR SOLUTION INTRAMUSCULAR; INTRAVENOUS; PARENTERAL at 07:09

## 2023-09-29 RX ADMIN — SODIUM CHLORIDE: 9 INJECTION, SOLUTION INTRAVENOUS at 07:09

## 2023-09-29 RX ADMIN — PROPOFOL 200 MG: 10 INJECTION, EMULSION INTRAVENOUS at 07:09

## 2023-09-29 RX ADMIN — MIDAZOLAM 2 MG: 1 INJECTION INTRAMUSCULAR; INTRAVENOUS at 07:09

## 2023-09-29 NOTE — OR NURSING
0903 Rec'd pt to PACU drowsy but arousable to verbal stimuli. No signs of distress noted, respirations even and unlabored. VSS. RUE dressing C/D/I with arm sling in place, cap refill less than 3 seconds, able to wiggle fingers on command. C/o pain, denies other needs. Will continue to monitor.     0911 Pt c/o pain. IV morphine given, see MAR for admin.     0930 Pt states pain unrelieved my morphine. IV dilaudid given, see MAR for admin.     0945 Out of PACU. VSS. No signs of bleeding/distress noted.     0950 Pt to ASC 24 awake and alert with no distress noted, respirations even and unlabored. No visitors at bedside. Bedside report given to SENAIT Moon RN. RUE dressing C/D/I with arm sling in place. Right hand cap refill less than 3 seconds, able to wiggle fingers on command. States pain improving, denies other needs. /96, P 94, R 14, O2 96% RA.

## 2023-09-29 NOTE — ANESTHESIA PREPROCEDURE EVALUATION
09/29/2023  Arti Kaur is a 48 y.o., female.      Pre-op Assessment    I have reviewed the Patient Summary Reports.    I have reviewed the NPO Status.   I have reviewed the Medications.     Review of Systems         Anesthesia Plan  Type of Anesthesia, risks & benefits discussed:    Anesthesia Type: Gen Supraglottic Airway  Intra-op Monitoring Plan: Standard ASA Monitors  Post Op Pain Control Plan: IV/PO Opioids PRN  Induction:  IV  Informed Consent: Informed consent signed with the Patient and all parties understand the risks and agree with anesthesia plan.  All questions answered.   ASA Score: 2    Ready For Surgery From Anesthesia Perspective.     .  NPO greater than 8 hours  NAC  Allergic to aspirin    Medical History   Hypertension Anxiety disorder, unspecified   Major depressive disorder, single episode, unspecified    GERD    Airway exam deferred (COVID precautions); adequate ROM at neck.

## 2023-09-29 NOTE — TRANSFER OF CARE
"Anesthesia Transfer of Care Note    Patient: Arti Kaur    Procedure(s) Performed: Procedure(s) (LRB):  TENOTOMY,ELBOW,WITH DEBRIDEMENT AND TENDON REPAIR (Right)  RELEASE, TRIGGER FINGER (Right)    Patient location: PACU    Anesthesia Type: general    Transport from OR: Transported from OR on 100% O2 by closed face mask with adequate spontaneous ventilation    Post pain: adequate analgesia    Post assessment: no apparent anesthetic complications    Post vital signs: stable    Level of consciousness: responds to stimulation    Nausea/Vomiting: no nausea/vomiting    Complications: none    Transfer of care protocol was followed      Last vitals:   Visit Vitals  BP (!) 145/67 (BP Location: Right arm)   Pulse 105   Temp 37.1 °C (98.7 °F)   Resp 15   Ht 5' 10" (1.778 m)   Wt 106.6 kg (235 lb)   SpO2 (!) 93%   Breastfeeding No   BMI 33.72 kg/m²     "

## 2023-09-29 NOTE — OP NOTE
[facility] - Orthopedic Periop Services  Operative Note    Surgery Date: 9/29/2023      Surgeon(s) and Role:     * Jhony Pabon MD - Primary    Assistant: Amari apple    Pre-op Diagnosis:   Lateral epicondylitis of right elbow [M77.11]     Post-op Diagnosis:  Post-Op Diagnosis Codes:     * Lateral epicondylitis of right elbow [M77.11]     Procedure:  Procedure(s) (LRB):  TENOTOMY,ELBOW,WITH DEBRIDEMENT AND TENDON REPAIR (Right)  RELEASE, TRIGGER FINGER (Right)     Anesthesia:  General    EBL:  5 cc    Implants:   Implant Name Type Inv. Item Serial No.  Lot No. LRB No. Used Action   KIT FIBERTAK DX 1.3X26.2MM - [id number]  KIT FIBERTAK DX 1.3X26.2MM  [employer] [id number] Right 1 Implanted       Tourniquet time: 31 mins    Complication:   none    Procedure: The patient was taken to the operating room and placedsupine.  Anesthesia was administered and pre-operative antibiotics were given.  A timeout was performed.  Patient was positioned appropriately and prepped and draped in the usual sterile fashion.        An Esmarch bandage was applied the tourniquet was taken up to 150.  4-1/2 cm incision made overlying lateral condyle and radial tissue.  Extensor carpi radialis longus and extensor digitorum was identified split incision.  After carefully dissecting through the ECRL muscle belly the CR be was able to be exposed on its attachment on the lateral epicondyle was visualized.  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  After excision of this the sonam-shaped origin of the extensor carpi radialis brevis was identified and decorticated with the use of a rongeur.  A 1.3 mm FiberTak anchor was then inserted and then sutures were passed through the fascia of the extensor carpi radialis brevis and extensor carpi radialis longus anteriorly and posteriorly and then tied over top to complete the repair of the extensor carpi radialis brevis.  The suture ends were  then clipped and the fascia was repaired with 1. Vicryl.  Subcutaneous tissue was then repaired with 2-0 Vicryl and 3-0 Monocryl was used in a running subcuticular fashion.      Attention was turned towards the right ring finger.   A 1.5 cm oblique incision was made overlying the A1 pulley of the digit.  Subcutaneous tissue was bluntly dissected to avoid injury to the digital nerves.  The A1 pulley was easily identified.  It was incised using a #15 blade scalpel along with tenotomy scissors.  Following this, she had full excursion of the tendon with no further triggering.    Any bleeding was stopped using pressure and the wound was irrigated.  The skin was closed with interrupted horizontal mattress sutures of #4-0 nylon. Sterile dressing was applied and she was returned to the Postanesthesia Care Unit in stable condition.        As the attending surgeon I was physically present for the key/critical portions of the procedure.        Disposition:awakened from anesthesia, extubated and taken to the recovery room in a stable condition, having suffered no apparent untoward event.                  Splint immobilization for the next 2 weeks followed by application of a removable wrist brace

## 2023-09-29 NOTE — ANESTHESIA POSTPROCEDURE EVALUATION
Anesthesia Post Evaluation    Patient: Arti Kaur    Procedure(s) Performed: Procedure(s) (LRB):  TENOTOMY,ELBOW,WITH DEBRIDEMENT AND TENDON REPAIR (Right)  RELEASE, TRIGGER FINGER (Right)    Final Anesthesia Type: general      Patient location during evaluation: PACU  Patient participation: Yes- Able to Participate  Level of consciousness: awake and alert  Post-procedure vital signs: reviewed and stable  Pain management: adequate  Airway patency: patent    PONV status at discharge: No PONV  Anesthetic complications: no      Cardiovascular status: hemodynamically stable  Respiratory status: unassisted  Hydration status: euvolemic  Follow-up not needed.          Vitals Value Taken Time   /90 09/29/23 0936   Temp 37.1 °C (98.7 °F) 09/29/23 0907   Pulse 94 09/29/23 0937   Resp 10 09/29/23 0937   SpO2 94 % 09/29/23 0937   Vitals shown include unvalidated device data.      No case tracking events are documented in the log.      Pain/Suzie Score: Pain Rating Prior to Med Admin: 7 (9/29/2023  9:30 AM)  Suzie Score: 9 (9/29/2023  9:20 AM)

## 2023-09-29 NOTE — PLAN OF CARE
Lateral Epicondyle Fascial Release                                                                                          Post Operative Protocol    Phase I - Maximum Protection (0 to 10 Days):    O to 10 Days:  Complete Immobilization in 90° Splint  Sling For 2 Weeks  Ice Continuously    Phase II - Progressive Stretching and Active Motion (10 Days to 4 Weeks):    10 Days to 2 Weeks:  Discontinue sling at 2 weeks  Modalities as needed for inflammation  Begin passive elbow and wrist range of motion in all planes as tolerated  Begin active shoulder protraction/retraction    Weeks 2 to 4:  Maintain program as outlined in weeks 0 to 2  Continue modalities to control inflammation  Initiate terminal range of motion stretching as tolerated  Begin active-assistive range of motion in elbow, wrist, and hand in all planes    Phase III - Early Strengthening (Weeks 4 to 6):    Weeks 4 to 6:  Modalities as needed  Continue with elbow and wrist terminal stretching in all planes  Begin active range of motion of the elbow and wrist in all planes  Initiate submaximal isometrics of the extensor bundle  Begin PREs of the flexor/pronator mass  Begin rotator cuff and scapular strengthening program  Scapular stabilization exercises  Proprioception and neuromuscular control drills  Manual resistance and PNF patterns    Phase IV - Advanced Strengthening and Plyometric Drills (Week 6 to 10):    Weeks 6 to 8:  Continue with end range stretching  Begin wrist and forearm strengthening in all planes, avoiding aggressive wrist extension exercises until week 10 to 12.    Weeks 8 to 10:  Begin global upper extremity gym strengthening program 3 to 4 times per week    Weeks 10 to 12:  Advance intensity of forearm and hand strengthening, including wrist extension  Initiate closed kinetic chain strengthening in protected range  Push-up progression  Seated serratus push-ups  Initiate Plyometric Drills  Plyoball wall drills  Double arm  rebounder drills progressing to single arm    Phase V - Interval Throwing Program    Week 12:  Follow-up appointment with physician  Initiate return to sport program per physician approval

## 2023-10-05 ENCOUNTER — OFFICE VISIT (OUTPATIENT)
Dept: ORTHOPEDICS | Facility: CLINIC | Age: 48
End: 2023-10-05
Payer: MEDICAID

## 2023-10-05 DIAGNOSIS — Z53.20 PROCEDURE NOT CARRIED OUT BECAUSE OF PATIENT'S DECISION: Primary | ICD-10-CM

## 2023-10-05 PROCEDURE — 99499 UNLISTED E&M SERVICE: CPT | Mod: S$PBB,,, | Performed by: NURSE PRACTITIONER

## 2023-10-05 PROCEDURE — 99212 OFFICE O/P EST SF 10 MIN: CPT | Mod: PBBFAC | Performed by: NURSE PRACTITIONER

## 2023-10-05 PROCEDURE — 99499 NO LOS: ICD-10-PCS | Mod: S$PBB,,, | Performed by: NURSE PRACTITIONER

## 2023-10-06 NOTE — DISCHARGE SUMMARY
[facility] - Orthopedic Periop Services  Discharge Note  Short Stay    Procedure(s) (LRB):  TENOTOMY,ELBOW,WITH DEBRIDEMENT AND TENDON REPAIR (Right)  RELEASE, TRIGGER FINGER (Right)      OUTCOME: Patient tolerated treatment/procedure well without complication and is now ready for discharge.    DISPOSITION: Home or Self Care    FINAL DIAGNOSIS:  Lateral epicondylitis of right elbow    FOLLOWUP: In clinic    DISCHARGE INSTRUCTIONS:    Discharge Procedure Orders   Diet general     Call MD for:  temperature >100.4     Call MD for:  persistent nausea and vomiting     Call MD for:  severe uncontrolled pain     Call MD for:  difficulty breathing, headache or visual disturbances     Call MD for:  redness, tenderness, or signs of infection (pain, swelling, redness, odor or green/yellow discharge around incision site)     Call MD for:  hives     Call MD for:  persistent dizziness or light-headedness     Call MD for:  extreme fatigue     Leave dressing on - Keep it clean, dry, and intact until clinic visit     Weight bearing restrictions (specify)   Order Comments: Please refer to op note for therapy plan         Clinical Reference Documents Added to Patient Instructions         Document    TRIGGER FINGER DISCHARGE INSTRUCTIONS (ENGLISH)            TIME SPENT ON DISCHARGE: 9 minutes

## 2023-10-12 ENCOUNTER — OFFICE VISIT (OUTPATIENT)
Dept: ORTHOPEDICS | Facility: CLINIC | Age: 48
End: 2023-10-12
Payer: MEDICAID

## 2023-10-12 DIAGNOSIS — M77.11 LATERAL EPICONDYLITIS, RIGHT ELBOW: ICD-10-CM

## 2023-10-12 DIAGNOSIS — M65.341 TRIGGER RING FINGER OF RIGHT HAND: Primary | ICD-10-CM

## 2023-10-12 PROCEDURE — 99213 OFFICE O/P EST LOW 20 MIN: CPT | Mod: PBBFAC | Performed by: NURSE PRACTITIONER

## 2023-10-12 PROCEDURE — 99024 POSTOP FOLLOW-UP VISIT: CPT | Mod: ,,, | Performed by: NURSE PRACTITIONER

## 2023-10-12 PROCEDURE — 99024 PR POST-OP FOLLOW-UP VISIT: ICD-10-PCS | Mod: ,,, | Performed by: NURSE PRACTITIONER

## 2023-10-12 PROCEDURE — 1159F MED LIST DOCD IN RCRD: CPT | Mod: CPTII,,, | Performed by: NURSE PRACTITIONER

## 2023-10-12 PROCEDURE — 1159F PR MEDICATION LIST DOCUMENTED IN MEDICAL RECORD: ICD-10-PCS | Mod: CPTII,,, | Performed by: NURSE PRACTITIONER

## 2023-10-12 RX ORDER — OXYCODONE AND ACETAMINOPHEN 10; 325 MG/1; MG/1
1 TABLET ORAL EVERY 6 HOURS PRN
Qty: 20 TABLET | Refills: 0 | Status: SHIPPED | OUTPATIENT
Start: 2023-10-12 | End: 2023-11-06 | Stop reason: SDUPTHER

## 2023-10-12 NOTE — PROGRESS NOTES
HISTORY OF PRESENT ILLNESS:       No surgery found No surgery found      Pt is here today for First post-operative followup of her No surgery found.  she is doing well.  We have reviewed her findings and discussed plan of care and future treatment options, including the physical therapy plan.      Patient is 13 days postop right elbow debridement and tendon repair, right trigger finger release.  She is doing well.  Incisions look good today.  Sutures removed and Steri-Strips applied.  Some tenderness around incisions, overall doing well.  No complaints today.                                                                               PHYSICAL EXAMINATION:     Incision sites healed well  No evidence of any erythema, infection or induration  Minimal effusion  2+ DP pulse                                                                                   ASSESSMENT:                                                                                                                                               1. Status post above, doing well.                                                                                                                               PLAN:       Wounds were treated today  DVT prophylaxis discussed  Therapy plan discussed in great detail today; all questions answered.                                                                           Discuss wound care today.  No heavy lifting with the right upper extremity.  Keep Steri-Strips intact.  No soaking of wounds.  Return to clinic 4 weeks with Dr. Pabon.                                                                    There are no Patient Instructions on file for this visit.

## 2023-11-01 ENCOUNTER — TELEPHONE (OUTPATIENT)
Dept: ORTHOPEDICS | Facility: CLINIC | Age: 48
End: 2023-11-01
Payer: MEDICAID

## 2023-11-06 ENCOUNTER — TELEPHONE (OUTPATIENT)
Dept: ORTHOPEDICS | Facility: CLINIC | Age: 48
End: 2023-11-06
Payer: MEDICAID

## 2023-11-06 RX ORDER — OXYCODONE AND ACETAMINOPHEN 10; 325 MG/1; MG/1
1 TABLET ORAL EVERY 6 HOURS PRN
Qty: 20 TABLET | Refills: 0 | Status: SHIPPED | OUTPATIENT
Start: 2023-11-06 | End: 2023-11-10

## 2023-11-06 NOTE — TELEPHONE ENCOUNTER
----- Message from Maggie Castañeda sent at 11/6/2023  9:23 AM CST -----  Pt calling to see if she can get a refill on her pain medication - [facility] [place] - call back # [phone]

## 2023-11-10 ENCOUNTER — OFFICE VISIT (OUTPATIENT)
Dept: ORTHOPEDICS | Facility: CLINIC | Age: 48
End: 2023-11-10
Payer: MEDICAID

## 2023-11-10 DIAGNOSIS — Z98.890 S/P TRIGGER FINGER RELEASE: Primary | ICD-10-CM

## 2023-11-10 DIAGNOSIS — M77.11 LATERAL EPICONDYLITIS, RIGHT ELBOW: ICD-10-CM

## 2023-11-10 PROCEDURE — 1159F PR MEDICATION LIST DOCUMENTED IN MEDICAL RECORD: ICD-10-PCS | Mod: CPTII,,, | Performed by: NURSE PRACTITIONER

## 2023-11-10 PROCEDURE — 99024 POSTOP FOLLOW-UP VISIT: CPT | Mod: ,,, | Performed by: NURSE PRACTITIONER

## 2023-11-10 PROCEDURE — 99024 PR POST-OP FOLLOW-UP VISIT: ICD-10-PCS | Mod: ,,, | Performed by: NURSE PRACTITIONER

## 2023-11-10 PROCEDURE — 99213 OFFICE O/P EST LOW 20 MIN: CPT | Mod: PBBFAC | Performed by: NURSE PRACTITIONER

## 2023-11-10 PROCEDURE — 1159F MED LIST DOCD IN RCRD: CPT | Mod: CPTII,,, | Performed by: NURSE PRACTITIONER

## 2023-11-10 RX ORDER — OXYCODONE AND ACETAMINOPHEN 5; 325 MG/1; MG/1
1 TABLET ORAL EVERY 8 HOURS PRN
Qty: 10 TABLET | Refills: 0 | Status: SHIPPED | OUTPATIENT
Start: 2023-11-10 | End: 2024-04-02

## 2023-11-10 RX ORDER — NAPROXEN 500 MG/1
500 TABLET ORAL 2 TIMES DAILY WITH MEALS
Qty: 30 TABLET | Refills: 0 | Status: SHIPPED | OUTPATIENT
Start: 2023-11-10 | End: 2023-12-04 | Stop reason: SDUPTHER

## 2023-11-10 NOTE — PROGRESS NOTES
HISTORY OF PRESENT ILLNESS:       No surgery found No surgery found      Pt is here today for Second post-operative followup of her No surgery found.    Patient is 6 weeks postop right elbow tenotomy and tendon repair, right trigger finger release.  Her finger is doing well, incision well healed.  No longer triggering.  Some tenderness around the incision.  In regards to her elbow, she has a good bit of swelling to her elbow.  Still reports some pain in the elbow.  Incision is well healed.  she is doing well.  She reports she is still having some pain in her elbow. She also still has swelling present in her right elbow.   Her fingers are healing well and she has good ROM.     We have reviewed her findings and discussed plan of care and future treatment options, including the physical therapy plan.                                                                                     PHYSICAL EXAMINATION:     Incision sites healed well  No evidence of any erythema, infection or induration  Minimal effusion  2+ DP pulse                                                                                   ASSESSMENT:                                                                                                                                               1. Status post above, doing well.                                                                                                                               PLAN:       Wounds were treated today  DVT prophylaxis discussed  Therapy plan discussed in great detail today; all questions answered.                                                                         We will start her on an anti-inflammatory and have her return to clinic with Dr. Pabon in 2 weeks.  She is requesting a refill on pain medication.  I will give her 10 additional pain pills, discuss this will be her last pain prescription.  We will put her on naproxen twice a day.                                                                       There are no Patient Instructions on file for this visit.

## 2023-12-04 RX ORDER — NAPROXEN 500 MG/1
500 TABLET ORAL 2 TIMES DAILY WITH MEALS
Qty: 30 TABLET | Refills: 0 | Status: SHIPPED | OUTPATIENT
Start: 2023-12-04

## 2023-12-19 ENCOUNTER — OFFICE VISIT (OUTPATIENT)
Dept: ORTHOPEDICS | Facility: CLINIC | Age: 48
End: 2023-12-19
Payer: MEDICAID

## 2023-12-19 DIAGNOSIS — M77.11 LATERAL EPICONDYLITIS, RIGHT ELBOW: Primary | ICD-10-CM

## 2023-12-19 PROCEDURE — 99024 POSTOP FOLLOW-UP VISIT: CPT | Mod: ,,, | Performed by: ORTHOPAEDIC SURGERY

## 2023-12-19 PROCEDURE — 99024 PR POST-OP FOLLOW-UP VISIT: ICD-10-PCS | Mod: ,,, | Performed by: ORTHOPAEDIC SURGERY

## 2023-12-19 PROCEDURE — 20605 DRAIN/INJ JOINT/BURSA W/O US: CPT | Mod: PBBFAC | Performed by: ORTHOPAEDIC SURGERY

## 2023-12-19 PROCEDURE — 1159F MED LIST DOCD IN RCRD: CPT | Mod: CPTII,,, | Performed by: ORTHOPAEDIC SURGERY

## 2023-12-19 PROCEDURE — 1160F RVW MEDS BY RX/DR IN RCRD: CPT | Mod: CPTII,,, | Performed by: ORTHOPAEDIC SURGERY

## 2023-12-19 PROCEDURE — 1159F PR MEDICATION LIST DOCUMENTED IN MEDICAL RECORD: ICD-10-PCS | Mod: CPTII,,, | Performed by: ORTHOPAEDIC SURGERY

## 2023-12-19 PROCEDURE — 99213 OFFICE O/P EST LOW 20 MIN: CPT | Mod: PBBFAC | Performed by: ORTHOPAEDIC SURGERY

## 2023-12-19 PROCEDURE — 1160F PR REVIEW ALL MEDS BY PRESCRIBER/CLIN PHARMACIST DOCUMENTED: ICD-10-PCS | Mod: CPTII,,, | Performed by: ORTHOPAEDIC SURGERY

## 2023-12-19 NOTE — PROGRESS NOTES
HISTORY OF PRESENT ILLNESS:       Tenotomy,elbow,with Debridement And Tendon Repair - Right and Release, Trigger Finger - Right 9/29/2023      Pt is here today for Third post-operative followup of her Tenotomy,elbow,with Debridement And Tendon Repair - Right and Release, Trigger Finger - Right.      She states she has had a significant amount of swelling in the right elbow but has had no significant pain as well.  Was doing well up to a few weeks ago and ever swelling developed    We have reviewed her findings and discussed plan of care and future treatment options, including the physical therapy plan.                                                                                     PHYSICAL EXAMINATION:     Incision sites healed well  No evidence of any erythema, infection or induration  Minimal effusion  2+ DP pulse                                                                                   ASSESSMENT:                                                                                                                                               1. Status post above, doing well.                                                                                                                               PLAN:       Wounds were treated today  DVT prophylaxis discussed  Therapy plan discussed in great detail today; all questions answered.    Going to obtain an x-ray at her next visit but this area was drained today please see the attached procedure note                                                                                                                                             There are no Patient Instructions on file for this visit.

## 2023-12-20 NOTE — PROCEDURES
R lateral epicondyleIntermediate Joint Aspiration/Injection    Date/Time: 12/19/2023 2:00 PM    Performed by: Jhony Pabon MD  Authorized by: Jhony Pabon MD      Location:  Elbow  Approach:  Anterolateral  Aspirate amount (ml):  40  Aspirate:  Serous

## 2024-02-08 ENCOUNTER — HOSPITAL ENCOUNTER (OUTPATIENT)
Dept: RADIOLOGY | Facility: HOSPITAL | Age: 49
Discharge: HOME | End: 2024-02-08
Attending: ORTHOPAEDIC SURGERY
Payer: OTHER GOVERNMENT

## 2024-02-08 ENCOUNTER — OFFICE VISIT (OUTPATIENT)
Dept: ORTHOPEDICS | Facility: CLINIC | Age: 49
End: 2024-02-08
Payer: OTHER GOVERNMENT

## 2024-02-08 DIAGNOSIS — M77.11 LATERAL EPICONDYLITIS, RIGHT ELBOW: ICD-10-CM

## 2024-02-08 DIAGNOSIS — M77.11 LATERAL EPICONDYLITIS, RIGHT ELBOW: Primary | ICD-10-CM

## 2024-02-08 DIAGNOSIS — M25.521 RIGHT ELBOW PAIN: ICD-10-CM

## 2024-02-08 PROCEDURE — 99212 OFFICE O/P EST SF 10 MIN: CPT | Mod: PBBFAC,25 | Performed by: ORTHOPAEDIC SURGERY

## 2024-02-08 PROCEDURE — 73070 X-RAY EXAM OF ELBOW: CPT | Mod: TC,RT

## 2024-02-08 PROCEDURE — 99212 OFFICE O/P EST SF 10 MIN: CPT | Mod: S$PBB,,, | Performed by: ORTHOPAEDIC SURGERY

## 2024-02-08 PROCEDURE — 73070 X-RAY EXAM OF ELBOW: CPT | Mod: 26,RT,, | Performed by: ORTHOPAEDIC SURGERY

## 2024-02-08 NOTE — PROGRESS NOTES
48 y.o. Female returns to clinic for a follow up visit regarding     ICD-10-CM ICD-9-CM   1. Lateral epicondylitis, right elbow  M77.11 726.32   2. Right elbow pain  M25.521 719.42        Pt is 4 months post surgery. Her elbow is swollen again.        Past Medical History:   Diagnosis Date    Anxiety disorder, unspecified     Hypertension     Major depressive disorder, single episode, unspecified      Past Surgical History:   Procedure Laterality Date    APPENDECTOMY N/A     HEEL SPUR SURGERY Right     LAPAROSCOPIC CHOLECYSTECTOMY N/A     REPAIR OF URETER      TENOTOMY, ELBOW, WITH DEBRIDEMENT AND TENDON REPAIR Right 9/29/2023    Procedure: TENOTOMY,ELBOW,WITH DEBRIDEMENT AND TENDON REPAIR;  Surgeon: Jhony Pabon MD;  Location: [facility] ORTHO OR;  Service: Orthopedics;  Laterality: Right;    TOTAL ABDOMINAL HYSTERECTOMY W/ BILATERAL SALPINGOOPHORECTOMY      TRIGGER FINGER RELEASE Right 9/29/2023    Procedure: RELEASE, TRIGGER FINGER;  Surgeon: Jhony Pabon MD;  Location: [facility] ORTHO OR;  Service: Orthopedics;  Laterality: Right;         PHYSICAL EXAMINATION:    Ortho/SPM Exam  Mild tenderness over the lateral epicondyle.  Large effusion seen along the posterolateral elbow    IMAGING:  X-Ray Elbow 2 Views Right    Result Date: 2/8/2024  See Procedure Notes for results. IMPRESSION: Please see Ortho procedure notes for report.  This procedure was auto-finalized by: Virtual Radiologist   2 views right elbow obtained today demonstrate no significant osseus abnormality.   ASSESSMENT:      ICD-10-CM ICD-9-CM   1. Lateral epicondylitis, right elbow  M77.11 726.32   2. Right elbow pain  M25.521 719.42       PLAN:     -Findings and treatment options were discussed with the patient  -All questions answered      I am going to obtain an MRI of the right elbow to appreciate for the presence of a joint effusion or reaction from the anchor.  Unsure why she continues to have such significant swelling.     There are no  Patient Instructions on file for this visit.      Orders Placed This Encounter   Procedures    X-Ray Elbow 2 Views Right    MRI Elbow Joint Without Contrast Right         Procedures

## 2024-02-26 ENCOUNTER — TELEPHONE (OUTPATIENT)
Dept: ORTHOPEDICS | Facility: CLINIC | Age: 49
End: 2024-02-26
Payer: OTHER GOVERNMENT

## 2024-02-26 NOTE — TELEPHONE ENCOUNTER
SPOKE WITH PATIENT, APPT MADE FOR TOMORROW TO DISCUSS MRI RESULTS----- Message from Jhony Pabon MD sent at 2/26/2024  7:32 AM CST -----  Bring back in tomorrow to talk

## 2024-02-27 ENCOUNTER — OFFICE VISIT (OUTPATIENT)
Dept: ORTHOPEDICS | Facility: CLINIC | Age: 49
End: 2024-02-27
Payer: MEDICAID

## 2024-02-27 DIAGNOSIS — M77.11 LATERAL EPICONDYLITIS, RIGHT ELBOW: Primary | ICD-10-CM

## 2024-02-27 PROCEDURE — 99213 OFFICE O/P EST LOW 20 MIN: CPT | Mod: PBBFAC | Performed by: ORTHOPAEDIC SURGERY

## 2024-02-27 PROCEDURE — 1159F MED LIST DOCD IN RCRD: CPT | Mod: CPTII,,, | Performed by: ORTHOPAEDIC SURGERY

## 2024-02-27 PROCEDURE — 1160F RVW MEDS BY RX/DR IN RCRD: CPT | Mod: CPTII,,, | Performed by: ORTHOPAEDIC SURGERY

## 2024-02-27 PROCEDURE — 99214 OFFICE O/P EST MOD 30 MIN: CPT | Mod: S$PBB,,, | Performed by: ORTHOPAEDIC SURGERY

## 2024-02-27 NOTE — H&P (VIEW-ONLY)
48 y.o. Female returns to clinic for a follow up visit regarding     ICD-10-CM ICD-9-CM   1. Lateral epicondylitis, right elbow  M77.11 726.32        Patient is here today to review the results of her MRI and discuss treatment options moving forward.        Past Medical History:   Diagnosis Date    Anxiety disorder, unspecified     Hypertension     Major depressive disorder, single episode, unspecified      Past Surgical History:   Procedure Laterality Date    APPENDECTOMY N/A     HEEL SPUR SURGERY Right     LAPAROSCOPIC CHOLECYSTECTOMY N/A     REPAIR OF URETER      TENOTOMY, ELBOW, WITH DEBRIDEMENT AND TENDON REPAIR Right 9/29/2023    Procedure: TENOTOMY,ELBOW,WITH DEBRIDEMENT AND TENDON REPAIR;  Surgeon: Jhony Pabon MD;  Location: [facility];  Service: Orthopedics;  Laterality: Right;    TOTAL ABDOMINAL HYSTERECTOMY W/ BILATERAL SALPINGOOPHORECTOMY      TRIGGER FINGER RELEASE Right 9/29/2023    Procedure: RELEASE, TRIGGER FINGER;  Surgeon: Jhnoy Pabon MD;  Location: [facility] OR;  Service: Orthopedics;  Laterality: Right;         PHYSICAL EXAMINATION:    Ortho/SPM Exam  Still has moderate amount of swelling just distal to the radial head and posterior.  Mild pain with varus stress.  Full range of motion otherwise demonstrated.    IMAGING:  MRI Elbow Joint Without Contrast Right    Result Date: 2/22/2024  EXAMINATION: MRI ELBOW WITHOUT CONTRAST RIGHT CLINICAL HISTORY: RIGHT ELBOW PAIN;Pain in right elbow TECHNIQUE: MRI ELBOW WITHOUT CONTRAST RIGHT COMPARISON: 2/8/24, 8/24/23 FINDINGS: Small elbow joint effusion. Partial thickness tear involving the common extensor tendon, series 601, image 10.  There is a fluid filled space measuring 0.5 cm between the lateral epicondyle and the common extensor tendon. Full-thickness tear of the radial collateral ligament, series 601, image 10. The common flexor tendons are intact. The medial collateral ligament complex is intact. T2 hyperintense/T1 hypointense  collection located within the subcutaneous soft tissues of the lateral elbow measuring 4.2 x 1.2 x 4.7 cm. The neurovascular bundles are intact.  No soft tissue mass.     Partial thickness tear involving the common extensor tendon, series 601, image 10. There is a fluid filled space measuring 0.5 cm between the lateral epicondyle and the common extensor tendon. Full-thickness tear of the radial collateral ligament, series 601, image 10. T2 hyperintense/T1 hypointense collection located within the subcutaneous soft tissues of the lateral elbow measuring 4.2 x 1.2 x 4.7 cm.  Possibly seroma or hematoma. Small elbow joint effusion. Electronically signed by: Lázaro Pan Date:    02/22/2024 Time:    15:36    ASSESSMENT:      ICD-10-CM ICD-9-CM   1. Lateral epicondylitis, right elbow  M77.11 726.32       PLAN:     -Findings and treatment options were discussed with the patient  -All questions answered      We once again discussed her history in ultimately she was doing well for a few weeks following surgery but 6 weeks thereafter believes she may have some stressed the elbow gotten worse following surgery.  Incomplete healing at the site of the common extensor tendon and this seroma has reaccumulated despite aspiration.  At this point I would like to take her back to the operating room decompress this hematoma and seroma and see if this is due to failure of the hardware such as the suture anchor that was used to repair the common extensor tendon.  There is also some evidence of injury to the radial collateral ligament.  This may require plication as well at the time.  I am going to plan for an open repair of the extensor carpi radialis brevis.  I am going to examined the radial collateral ligament as well.  This likely was not iatrogenic could have been secondary to re-injury following her surgery there were certainly going to be prepared to take a look at this and see if there is any new injury to the lateral  collateral ligament complex which may need to be addressed at the time of surgery as well    There are no Patient Instructions on file for this visit.      No orders of the defined types were placed in this encounter.        Procedures

## 2024-02-27 NOTE — PROGRESS NOTES
48 y.o. Female returns to clinic for a follow up visit regarding     ICD-10-CM ICD-9-CM   1. Lateral epicondylitis, right elbow  M77.11 726.32        Patient is here today to review the results of her MRI and discuss treatment options moving forward.        Past Medical History:   Diagnosis Date    Anxiety disorder, unspecified     Hypertension     Major depressive disorder, single episode, unspecified      Past Surgical History:   Procedure Laterality Date    APPENDECTOMY N/A     HEEL SPUR SURGERY Right     LAPAROSCOPIC CHOLECYSTECTOMY N/A     REPAIR OF URETER      TENOTOMY, ELBOW, WITH DEBRIDEMENT AND TENDON REPAIR Right 9/29/2023    Procedure: TENOTOMY,ELBOW,WITH DEBRIDEMENT AND TENDON REPAIR;  Surgeon: Jhony Pabon MD;  Location: [facility] OR;  Service: Orthopedics;  Laterality: Right;    TOTAL ABDOMINAL HYSTERECTOMY W/ BILATERAL SALPINGOOPHORECTOMY      TRIGGER FINGER RELEASE Right 9/29/2023    Procedure: RELEASE, TRIGGER FINGER;  Surgeon: Jhony Pabon MD;  Location: [facility] OR;  Service: Orthopedics;  Laterality: Right;         PHYSICAL EXAMINATION:    Ortho/SPM Exam  Still has moderate amount of swelling just distal to the radial head and posterior.  Mild pain with varus stress.  Full range of motion otherwise demonstrated.    IMAGING:  MRI Elbow Joint Without Contrast Right    Result Date: 2/22/2024  EXAMINATION: MRI ELBOW WITHOUT CONTRAST RIGHT CLINICAL HISTORY: RIGHT ELBOW PAIN;Pain in right elbow TECHNIQUE: MRI ELBOW WITHOUT CONTRAST RIGHT COMPARISON: 2/8/24, 8/24/23 FINDINGS: Small elbow joint effusion. Partial thickness tear involving the common extensor tendon, series 601, image 10.  There is a fluid filled space measuring 0.5 cm between the lateral epicondyle and the common extensor tendon. Full-thickness tear of the radial collateral ligament, series 601, image 10. The common flexor tendons are intact. The medial collateral ligament complex is intact. T2 hyperintense/T1 hypointense  collection located within the subcutaneous soft tissues of the lateral elbow measuring 4.2 x 1.2 x 4.7 cm. The neurovascular bundles are intact.  No soft tissue mass.     Partial thickness tear involving the common extensor tendon, series 601, image 10. There is a fluid filled space measuring 0.5 cm between the lateral epicondyle and the common extensor tendon. Full-thickness tear of the radial collateral ligament, series 601, image 10. T2 hyperintense/T1 hypointense collection located within the subcutaneous soft tissues of the lateral elbow measuring 4.2 x 1.2 x 4.7 cm.  Possibly seroma or hematoma. Small elbow joint effusion. Electronically signed by: Lázaro Pan Date:    02/22/2024 Time:    15:36    ASSESSMENT:      ICD-10-CM ICD-9-CM   1. Lateral epicondylitis, right elbow  M77.11 726.32       PLAN:     -Findings and treatment options were discussed with the patient  -All questions answered      We once again discussed her history in ultimately she was doing well for a few weeks following surgery but 6 weeks thereafter believes she may have some stressed the elbow gotten worse following surgery.  Incomplete healing at the site of the common extensor tendon and this seroma has reaccumulated despite aspiration.  At this point I would like to take her back to the operating room decompress this hematoma and seroma and see if this is due to failure of the hardware such as the suture anchor that was used to repair the common extensor tendon.  There is also some evidence of injury to the radial collateral ligament.  This may require plication as well at the time.  I am going to plan for an open repair of the extensor carpi radialis brevis.  I am going to examined the radial collateral ligament as well.  This likely was not iatrogenic could have been secondary to re-injury following her surgery there were certainly going to be prepared to take a look at this and see if there is any new injury to the lateral  collateral ligament complex which may need to be addressed at the time of surgery as well    There are no Patient Instructions on file for this visit.      No orders of the defined types were placed in this encounter.        Procedures

## 2024-02-28 DIAGNOSIS — M77.11 LATERAL EPICONDYLITIS, RIGHT ELBOW: Primary | ICD-10-CM

## 2024-02-28 DIAGNOSIS — Z01.818 PREPROCEDURAL EXAMINATION: Primary | ICD-10-CM

## 2024-02-28 RX ORDER — MUPIROCIN 20 MG/G
OINTMENT TOPICAL
Status: CANCELLED | OUTPATIENT
Start: 2024-02-28

## 2024-02-28 RX ORDER — CEFAZOLIN SODIUM 2 G/50ML
2 SOLUTION INTRAVENOUS
Status: CANCELLED | OUTPATIENT
Start: 2024-02-28

## 2024-02-28 RX ORDER — SODIUM CHLORIDE 9 MG/ML
INJECTION, SOLUTION INTRAVENOUS CONTINUOUS
Status: CANCELLED | OUTPATIENT
Start: 2024-02-28

## 2024-03-15 ENCOUNTER — TELEPHONE (OUTPATIENT)
Dept: ORTHOPEDICS | Facility: CLINIC | Age: 49
End: 2024-03-15
Payer: OTHER GOVERNMENT

## 2024-03-18 ENCOUNTER — ANESTHESIA EVENT (OUTPATIENT)
Dept: SURGERY | Facility: HOSPITAL | Age: 49
End: 2024-03-18
Payer: OTHER GOVERNMENT

## 2024-03-18 ENCOUNTER — HOSPITAL ENCOUNTER (OUTPATIENT)
Facility: HOSPITAL | Age: 49
Discharge: HOME | End: 2024-03-18
Attending: ORTHOPAEDIC SURGERY | Admitting: ORTHOPAEDIC SURGERY
Payer: OTHER GOVERNMENT

## 2024-03-18 ENCOUNTER — ANESTHESIA (OUTPATIENT)
Dept: SURGERY | Facility: HOSPITAL | Age: 49
End: 2024-03-18
Payer: OTHER GOVERNMENT

## 2024-03-18 VITALS
OXYGEN SATURATION: 95 % | HEART RATE: 91 BPM | BODY MASS INDEX: 34.36 KG/M2 | SYSTOLIC BLOOD PRESSURE: 133 MMHG | WEIGHT: 240 LBS | TEMPERATURE: 99 F | RESPIRATION RATE: 14 BRPM | DIASTOLIC BLOOD PRESSURE: 85 MMHG | HEIGHT: 70 IN

## 2024-03-18 DIAGNOSIS — M77.11 LATERAL EPICONDYLITIS OF RIGHT ELBOW: Primary | ICD-10-CM

## 2024-03-18 DIAGNOSIS — M77.11 LATERAL EPICONDYLITIS, RIGHT ELBOW: ICD-10-CM

## 2024-03-18 LAB
GLUCOSE SERPL-MCNC: 121 MG/DL (ref 70–105)
GLUCOSE SERPL-MCNC: 131 MG/DL (ref 70–105)

## 2024-03-18 PROCEDURE — D9220A PRA ANESTHESIA: Mod: CRNA,,, | Performed by: NURSE ANESTHETIST, CERTIFIED REGISTERED

## 2024-03-18 PROCEDURE — 63600175 PHARM REV CODE 636 W HCPCS: Performed by: NURSE ANESTHETIST, CERTIFIED REGISTERED

## 2024-03-18 PROCEDURE — C1713 ANCHOR/SCREW BN/BN,TIS/BN: HCPCS | Performed by: ORTHOPAEDIC SURGERY

## 2024-03-18 PROCEDURE — 71000033 HC RECOVERY, INTIAL HOUR: Performed by: ORTHOPAEDIC SURGERY

## 2024-03-18 PROCEDURE — 63600175 PHARM REV CODE 636 W HCPCS: Performed by: ANESTHESIOLOGY

## 2024-03-18 PROCEDURE — 24359 REPAIR ELBOW DEB/ATTCH OPEN: CPT | Mod: RT,,, | Performed by: ORTHOPAEDIC SURGERY

## 2024-03-18 PROCEDURE — 82962 GLUCOSE BLOOD TEST: CPT

## 2024-03-18 PROCEDURE — 37000008 HC ANESTHESIA 1ST 15 MINUTES: Performed by: ORTHOPAEDIC SURGERY

## 2024-03-18 PROCEDURE — 71000015 HC POSTOP RECOV 1ST HR: Performed by: ORTHOPAEDIC SURGERY

## 2024-03-18 PROCEDURE — 27201423 OPTIME MED/SURG SUP & DEVICES STERILE SUPPLY: Performed by: ORTHOPAEDIC SURGERY

## 2024-03-18 PROCEDURE — 36000710: Performed by: ORTHOPAEDIC SURGERY

## 2024-03-18 PROCEDURE — 37000009 HC ANESTHESIA EA ADD 15 MINS: Performed by: ORTHOPAEDIC SURGERY

## 2024-03-18 PROCEDURE — 25000003 PHARM REV CODE 250: Performed by: ORTHOPAEDIC SURGERY

## 2024-03-18 PROCEDURE — D9220A PRA ANESTHESIA: Mod: ANES,,, | Performed by: ANESTHESIOLOGY

## 2024-03-18 PROCEDURE — 25000003 PHARM REV CODE 250: Performed by: NURSE ANESTHETIST, CERTIFIED REGISTERED

## 2024-03-18 PROCEDURE — 63600175 PHARM REV CODE 636 W HCPCS: Performed by: ORTHOPAEDIC SURGERY

## 2024-03-18 PROCEDURE — 36000711: Performed by: ORTHOPAEDIC SURGERY

## 2024-03-18 DEVICE — SUTR ANCH,BIO-COMP S-TAK SM JNT
Type: IMPLANTABLE DEVICE | Site: ELBOW | Status: FUNCTIONAL
Brand: ARTHREX®

## 2024-03-18 RX ORDER — ROCURONIUM BROMIDE 10 MG/ML
INJECTION, SOLUTION INTRAVENOUS
Status: DISCONTINUED | OUTPATIENT
Start: 2024-03-18 | End: 2024-03-18

## 2024-03-18 RX ORDER — MIDAZOLAM HYDROCHLORIDE 1 MG/ML
INJECTION INTRAMUSCULAR; INTRAVENOUS
Status: DISCONTINUED | OUTPATIENT
Start: 2024-03-18 | End: 2024-03-18

## 2024-03-18 RX ORDER — OXYCODONE HYDROCHLORIDE 5 MG/1
5 TABLET ORAL
Status: DISCONTINUED | OUTPATIENT
Start: 2024-03-18 | End: 2024-03-18 | Stop reason: HOSPADM

## 2024-03-18 RX ORDER — PROPOFOL 10 MG/ML
VIAL (ML) INTRAVENOUS
Status: DISCONTINUED | OUTPATIENT
Start: 2024-03-18 | End: 2024-03-18

## 2024-03-18 RX ORDER — SODIUM CHLORIDE, SODIUM LACTATE, POTASSIUM CHLORIDE, CALCIUM CHLORIDE 600; 310; 30; 20 MG/100ML; MG/100ML; MG/100ML; MG/100ML
125 INJECTION, SOLUTION INTRAVENOUS CONTINUOUS
Status: DISCONTINUED | OUTPATIENT
Start: 2024-03-18 | End: 2024-03-18 | Stop reason: HOSPADM

## 2024-03-18 RX ORDER — MORPHINE SULFATE 10 MG/ML
4 INJECTION INTRAMUSCULAR; INTRAVENOUS; SUBCUTANEOUS EVERY 5 MIN PRN
Status: DISCONTINUED | OUTPATIENT
Start: 2024-03-18 | End: 2024-03-18 | Stop reason: HOSPADM

## 2024-03-18 RX ORDER — LIDOCAINE HYDROCHLORIDE 20 MG/ML
INJECTION, SOLUTION EPIDURAL; INFILTRATION; INTRACAUDAL; PERINEURAL
Status: DISCONTINUED | OUTPATIENT
Start: 2024-03-18 | End: 2024-03-18

## 2024-03-18 RX ORDER — FENTANYL CITRATE 50 UG/ML
INJECTION, SOLUTION INTRAMUSCULAR; INTRAVENOUS
Status: DISCONTINUED | OUTPATIENT
Start: 2024-03-18 | End: 2024-03-18

## 2024-03-18 RX ORDER — MEPERIDINE HYDROCHLORIDE 25 MG/ML
25 INJECTION INTRAMUSCULAR; INTRAVENOUS; SUBCUTANEOUS EVERY 10 MIN PRN
Status: DISCONTINUED | OUTPATIENT
Start: 2024-03-18 | End: 2024-03-18 | Stop reason: HOSPADM

## 2024-03-18 RX ORDER — OXYCODONE HYDROCHLORIDE 5 MG/1
5 TABLET ORAL EVERY 4 HOURS PRN
Status: DISCONTINUED | OUTPATIENT
Start: 2024-03-18 | End: 2024-03-18 | Stop reason: HOSPADM

## 2024-03-18 RX ORDER — PROMETHAZINE HYDROCHLORIDE 25 MG/1
25 TABLET ORAL EVERY 6 HOURS PRN
Status: DISCONTINUED | OUTPATIENT
Start: 2024-03-18 | End: 2024-03-18 | Stop reason: HOSPADM

## 2024-03-18 RX ORDER — OXYCODONE HYDROCHLORIDE 5 MG/1
10 TABLET ORAL EVERY 4 HOURS PRN
Status: DISCONTINUED | OUTPATIENT
Start: 2024-03-18 | End: 2024-03-18 | Stop reason: HOSPADM

## 2024-03-18 RX ORDER — OXYCODONE AND ACETAMINOPHEN 10; 325 MG/1; MG/1
1 TABLET ORAL EVERY 6 HOURS PRN
Qty: 30 TABLET | Refills: 0 | Status: SHIPPED | OUTPATIENT
Start: 2024-03-18 | End: 2024-04-02

## 2024-03-18 RX ORDER — ONDANSETRON HYDROCHLORIDE 2 MG/ML
4 INJECTION, SOLUTION INTRAVENOUS DAILY PRN
Status: DISCONTINUED | OUTPATIENT
Start: 2024-03-18 | End: 2024-03-18 | Stop reason: HOSPADM

## 2024-03-18 RX ORDER — ONDANSETRON 4 MG/1
4 TABLET, ORALLY DISINTEGRATING ORAL EVERY 8 HOURS PRN
Qty: 30 TABLET | Refills: 0 | Status: SHIPPED | OUTPATIENT
Start: 2024-03-18 | End: 2024-04-02

## 2024-03-18 RX ORDER — SODIUM CHLORIDE 0.9 % (FLUSH) 0.9 %
2 SYRINGE (ML) INJECTION
Status: DISCONTINUED | OUTPATIENT
Start: 2024-03-18 | End: 2024-03-18 | Stop reason: HOSPADM

## 2024-03-18 RX ORDER — DEXAMETHASONE SODIUM PHOSPHATE 4 MG/ML
INJECTION, SOLUTION INTRA-ARTICULAR; INTRALESIONAL; INTRAMUSCULAR; INTRAVENOUS; SOFT TISSUE
Status: DISCONTINUED | OUTPATIENT
Start: 2024-03-18 | End: 2024-03-18

## 2024-03-18 RX ORDER — MUPIROCIN 20 MG/G
OINTMENT TOPICAL 2 TIMES DAILY
Status: DISCONTINUED | OUTPATIENT
Start: 2024-03-18 | End: 2024-03-18 | Stop reason: HOSPADM

## 2024-03-18 RX ORDER — DIPHENHYDRAMINE HYDROCHLORIDE 50 MG/ML
25 INJECTION INTRAMUSCULAR; INTRAVENOUS EVERY 6 HOURS PRN
Status: DISCONTINUED | OUTPATIENT
Start: 2024-03-18 | End: 2024-03-18 | Stop reason: HOSPADM

## 2024-03-18 RX ORDER — SODIUM CHLORIDE 9 MG/ML
INJECTION, SOLUTION INTRAVENOUS CONTINUOUS
Status: DISCONTINUED | OUTPATIENT
Start: 2024-03-18 | End: 2024-03-18 | Stop reason: HOSPADM

## 2024-03-18 RX ORDER — ACETAMINOPHEN 10 MG/ML
1000 INJECTION, SOLUTION INTRAVENOUS ONCE
Status: DISCONTINUED | OUTPATIENT
Start: 2024-03-18 | End: 2024-03-18 | Stop reason: HOSPADM

## 2024-03-18 RX ORDER — HYDROMORPHONE HYDROCHLORIDE 2 MG/ML
0.5 INJECTION, SOLUTION INTRAMUSCULAR; INTRAVENOUS; SUBCUTANEOUS EVERY 5 MIN PRN
Status: COMPLETED | OUTPATIENT
Start: 2024-03-18 | End: 2024-03-18

## 2024-03-18 RX ORDER — ONDANSETRON HYDROCHLORIDE 2 MG/ML
INJECTION, SOLUTION INTRAVENOUS
Status: DISCONTINUED | OUTPATIENT
Start: 2024-03-18 | End: 2024-03-18

## 2024-03-18 RX ORDER — DOCUSATE SODIUM 100 MG/1
100 CAPSULE, LIQUID FILLED ORAL 2 TIMES DAILY
Status: DISCONTINUED | OUTPATIENT
Start: 2024-03-18 | End: 2024-03-18 | Stop reason: HOSPADM

## 2024-03-18 RX ORDER — MUPIROCIN 20 MG/G
OINTMENT TOPICAL
Status: DISCONTINUED | OUTPATIENT
Start: 2024-03-18 | End: 2024-03-18 | Stop reason: HOSPADM

## 2024-03-18 RX ORDER — ONDANSETRON 4 MG/1
8 TABLET, ORALLY DISINTEGRATING ORAL EVERY 8 HOURS PRN
Status: DISCONTINUED | OUTPATIENT
Start: 2024-03-18 | End: 2024-03-18 | Stop reason: HOSPADM

## 2024-03-18 RX ORDER — KETOROLAC TROMETHAMINE 30 MG/ML
INJECTION, SOLUTION INTRAMUSCULAR; INTRAVENOUS
Status: DISCONTINUED | OUTPATIENT
Start: 2024-03-18 | End: 2024-03-18

## 2024-03-18 RX ADMIN — FENTANYL CITRATE 100 MCG: 50 INJECTION INTRAMUSCULAR; INTRAVENOUS at 04:03

## 2024-03-18 RX ADMIN — ROCURONIUM BROMIDE 40 MG: 10 INJECTION, SOLUTION INTRAVENOUS at 02:03

## 2024-03-18 RX ADMIN — CEFAZOLIN 2 G: 2 INJECTION, POWDER, FOR SOLUTION INTRAMUSCULAR; INTRAVENOUS at 02:03

## 2024-03-18 RX ADMIN — KETOROLAC TROMETHAMINE 30 MG: 30 INJECTION, SOLUTION INTRAMUSCULAR at 04:03

## 2024-03-18 RX ADMIN — HYDROMORPHONE HYDROCHLORIDE 0.5 MG: 2 INJECTION, SOLUTION INTRAMUSCULAR; INTRAVENOUS; SUBCUTANEOUS at 05:03

## 2024-03-18 RX ADMIN — HYDROMORPHONE HYDROCHLORIDE 0.5 MG: 2 INJECTION, SOLUTION INTRAMUSCULAR; INTRAVENOUS; SUBCUTANEOUS at 04:03

## 2024-03-18 RX ADMIN — MIDAZOLAM 2 MG: 1 INJECTION INTRAMUSCULAR; INTRAVENOUS at 02:03

## 2024-03-18 RX ADMIN — SUGAMMADEX 200 MG: 100 INJECTION, SOLUTION INTRAVENOUS at 04:03

## 2024-03-18 RX ADMIN — LIDOCAINE HYDROCHLORIDE 60 MG: 20 INJECTION, SOLUTION INTRAVENOUS at 02:03

## 2024-03-18 RX ADMIN — DEXAMETHASONE SODIUM PHOSPHATE 8 MG: 4 INJECTION, SOLUTION INTRA-ARTICULAR; INTRALESIONAL; INTRAMUSCULAR; INTRAVENOUS; SOFT TISSUE at 02:03

## 2024-03-18 RX ADMIN — FENTANYL CITRATE 100 MCG: 50 INJECTION INTRAMUSCULAR; INTRAVENOUS at 02:03

## 2024-03-18 RX ADMIN — PROPOFOL 170 MG: 10 INJECTION, EMULSION INTRAVENOUS at 02:03

## 2024-03-18 RX ADMIN — ONDANSETRON 8 MG: 2 INJECTION INTRAMUSCULAR; INTRAVENOUS at 02:03

## 2024-03-18 RX ADMIN — SODIUM CHLORIDE: 9 INJECTION, SOLUTION INTRAVENOUS at 01:03

## 2024-03-18 NOTE — OP NOTE
[facility] - Orthopedic Periop Services  Operative Note    Surgery Date: 3/18/2024      Surgeon(s) and Role:     * Jhony Pabon MD - Primary    Assistant: phan jaqruin    Pre-op Diagnosis:   Lateral epicondylitis, right elbow [M77.11]     Post-op Diagnosis:  Post-Op Diagnosis Codes:     * Lateral epicondylitis, right elbow [M77.11]     Procedure:  Procedure(s) (LRB):  REPAIR, EXTENSOR CARPI RADIALIS BREVIS  ARTHROSCOPY, ELBOW, WITH EXTENSIVE DEBRIDEMENT (Right)     Anesthesia:  General    EBL:  5 cc    Implants:   Implant Name Type Inv. Item Serial No.  Lot No. LRB No. Used Action   ANCHOR SUTURETAK BIOCOMP SMALL - [id number]  ANCHOR SUTURETAK BIOCOMP SMALL  [employer] [id number] Right 1 Implanted       Tourniquet time: 72 mins    Complication:   none    Procedure: The patient was taken to the operating room and placed prone. Anesthesia was administered and pre-operative antibiotics were given.  A timeout was performed.  Patient was positioned appropriately and prepped and draped in the usual sterile fashion.      An Esmarch bandage had been applied the tourniquet was taken up to 250 mmHg.  I began the procedure by insufflating the joint with 30 cc of saline and then creating a standard anteromedial portal and inserted the arthroscope.  With the arthroscope inserted I detected the presence of mild chondromalacia of the radial head and what appeared to be a fistula as there was a rent within the capsule and residual fibers of the more distal extensor carpi radialis brevis and this appeared to be responsible for the seroma present on the anterolateral aspect of the proximal forearm.  I created a small poke hole incision laterally and inserted a motorized shaver in order to debride the areas of chondromalacia along the radial head and also the areas of synovitis and soft tissue fraying seen at the origin of the extensor carpi radialis brevis.  I did detect some areas of residual tendinosis as he has had a grayish  hue to it and I debrided this quite thoroughly in order to perform an arthroscopic resection of the nurse lesion.  I did feel this necessitated formal open repair in order to close off any sinus tract that could develop between the joint and the underlying soft tissue.  I then placed a spinal needle within the area of the defect and recreated the open incision made with a prior surgery after I removed the scope.  The incision was carried through skin and subcutaneous tissue and then I found the plane between the extensor carpi radialis longus and brevis and was able to identify this and did see that there was significant fluid emanating from this area consistent with a sinus tract.  I further debrided the humeral attachment of the extensor carpi radialis brevis and there were some residual fibers and tissue which I did completely remove at this time.  I used a rongeur to decorticate the bony footprint in this area and found the area where the previous suture anchor had been placed and removed retained suture fragments in this area.  I elected to place a 3.0 mm BioComposite single loaded anchor in this location after placement of the anchor I then passed suture in a Krackow fashion used a simple pass on the opposite limb in order to tied this down and to complete the repair of the extensor carpi radialis brevis.  After doing this I utilized 2. FiberWire in order to further augment the repair with simple suture configurations and after this was complete repair was complete I put the arthroscope back in the medial portal and visualized the area.  There was no significant fluid extravasation demonstrated and the capsular rent had been adequately repaired.  This completed the procedure.   The scope was removed I closed subcutaneous tissue with 2-0 Vicryl followed by 3-0 Monocryl in a running subcuticular fashion.  The portals were closed with 3-0 nylon.  Sterile dressings were applied followed by application of a  posterior slab splint      Disposition:awakened from anesthesia, extubated and taken to the recovery room in a stable condition, having suffered no apparent untoward event.

## 2024-03-18 NOTE — TRANSFER OF CARE
"Anesthesia Transfer of Care Note    Patient: Arti Kaur    Procedure(s) Performed: Procedure(s) (LRB):  REPAIR, EXTENSOR CARPI RADIALIS BREVIS  ARTHROSCOPY, ELBOW, WITH EXTENSIVE DEBRIDEMENT (Right)    Patient location: PACU    Anesthesia Type: general    Transport from OR: Transported from OR on room air with adequate spontaneous ventilation    Post pain: adequate analgesia    Post assessment: no apparent anesthetic complications and tolerated procedure well    Post vital signs: stable    Level of consciousness: awake, oriented and alert    Nausea/Vomiting: no nausea/vomiting    Complications: none    Transfer of care protocol was followed      Last vitals: Visit Vitals  /75 (BP Location: Left arm, Patient Position: Lying)   Pulse 99   Temp 37.1 °C (98.7 °F) (Oral)   Resp 11   Ht 5' 10" (1.778 m)   Wt 108.9 kg (240 lb)   SpO2 (!) 94%   Breastfeeding No   BMI 34.44 kg/m²     "

## 2024-03-18 NOTE — ANESTHESIA PREPROCEDURE EVALUATION
03/18/2024  Arti Kaur is a 48 y.o., female.      Pre-op Assessment    I have reviewed the Patient Summary Reports.     I have reviewed the Nursing Notes. I have reviewed the NPO Status.   I have reviewed the Medications.     Review of Systems  Anesthesia Hx:  No problems with previous Anesthesia                Social:  Non-Smoker, No Alcohol Use       Hematology/Oncology:  Hematology Normal   Oncology Normal                                   EENT/Dental:  EENT/Dental Normal           Cardiovascular:     Hypertension                                        Pulmonary:    Asthma                    Renal/:  Renal/ Normal                 Hepatic/GI:  Hepatic/GI Normal                 Musculoskeletal:  Musculoskeletal Normal                Neurological:  Neurology Normal                                      Endocrine:  Diabetes, well controlled, type 2         Obesity / BMI > 30  Dermatological:  Skin Normal    Psych:  Psychiatric Normal                    Physical Exam  General: Well nourished    Airway:  Mallampati: III / III  Mouth Opening: Normal  TM Distance: > 6 cm  Tongue: Normal  Neck ROM: Normal ROM    Chest/Lungs:  Clear to auscultation, Normal Respiratory Rate    Heart:  Rate: Normal  Rhythm: Regular Rhythm        Anesthesia Plan  Type of Anesthesia, risks & benefits discussed:    Anesthesia Type: Gen ETT  Intra-op Monitoring Plan: Standard ASA Monitors  Post Op Pain Control Plan: multimodal analgesia  Induction:  IV  Informed Consent: Informed consent signed with the Patient and all parties understand the risks and agree with anesthesia plan.  All questions answered. Patient consented to blood products? Yes  ASA Score: 2  Day of Surgery Review of History & Physical: H&P Update referred to the surgeon/provider.I have interviewed and examined the patient. I have reviewed the patient's H&P dated:  There are no significant changes.     Ready For Surgery From Anesthesia Perspective.     .

## 2024-03-18 NOTE — DISCHARGE INSTRUCTIONS
Contact Dr. Pabon's clinic for 2 week follow up appointment with Gretchen Garrido, Nurse Practitioner, [phone].  --

## 2024-03-18 NOTE — ANESTHESIA POSTPROCEDURE EVALUATION
Anesthesia Post Evaluation    Patient: Arti Kaur    Procedure(s) Performed: Procedure(s) (LRB):  REPAIR, EXTENSOR CARPI RADIALIS BREVIS  ARTHROSCOPY, ELBOW, WITH EXTENSIVE DEBRIDEMENT (Right)    Final Anesthesia Type: general      Patient location during evaluation: PACU  Patient participation: Yes- Able to Participate  Level of consciousness: awake and sedated  Post-procedure vital signs: reviewed and stable  Pain management: adequate  Airway patency: patent    PONV status at discharge: No PONV  Anesthetic complications: no      Cardiovascular status: blood pressure returned to baseline  Respiratory status: unassisted  Hydration status: euvolemic  Follow-up not needed.              Vitals Value Taken Time   /81 03/18/24 1703   Temp 37.1 °C (98.7 °F) 03/18/24 1653   Pulse 93 03/18/24 1703   Resp 12 03/18/24 1703   SpO2 89 % 03/18/24 1703   Vitals shown include unvalidated device data.      No case tracking events are documented in the log.      Pain/Suzie Score: Pain Rating Prior to Med Admin: 7 (3/18/2024  4:59 PM)

## 2024-03-20 NOTE — DISCHARGE SUMMARY
[facility] - Orthopedic Periop Services  Discharge Note  Short Stay    Procedure(s) (LRB):  REPAIR, EXTENSOR CARPI RADIALIS BREVIS  ARTHROSCOPY, ELBOW, WITH EXTENSIVE DEBRIDEMENT (Right)      OUTCOME: Patient tolerated treatment/procedure well without complication and is now ready for discharge.    DISPOSITION: Home or Self Care    FINAL DIAGNOSIS:  Lateral epicondylitis of right elbow    FOLLOWUP: In clinic    DISCHARGE INSTRUCTIONS:    Discharge Procedure Orders   SLING ORTHOPEDIC MEDIUM FOR HOME USE   Order Comments: Abduction pillow sling for small, medium, large, massive rotator cuff repairs; Abduction pillow sling for proximal humerus ORIF, reverse shoulder arthroplasty, total shoulder arthroplasty.  Regular sling for clavicle ORIF, shoulder debridements.     Diet general     Call MD for:  temperature >100.4     Call MD for:  persistent nausea and vomiting     Call MD for:  severe uncontrolled pain     Call MD for:  difficulty breathing, headache or visual disturbances     Call MD for:  redness, tenderness, or signs of infection (pain, swelling, redness, odor or green/yellow discharge around incision site)     Call MD for:  hives     Call MD for:  persistent dizziness or light-headedness     Call MD for:  extreme fatigue     Leave dressing on - Keep it clean, dry, and intact until clinic visit   Order Comments: For shoulder arthroscopy patients, ok to remove dressing in 3 days and apply band-aids to portal sites.  For ORIF, total shoulder arthroplasty, or reverse shoulder arthroplasty, keeping dressing clean, dry, and intact until clinic follow up.     Non weight bearing        TIME SPENT ON DISCHARGE: 9 minutes

## 2024-04-02 ENCOUNTER — OFFICE VISIT (OUTPATIENT)
Dept: ORTHOPEDICS | Facility: CLINIC | Age: 49
End: 2024-04-02
Payer: OTHER GOVERNMENT

## 2024-04-02 DIAGNOSIS — M77.11 LATERAL EPICONDYLITIS, RIGHT ELBOW: ICD-10-CM

## 2024-04-02 DIAGNOSIS — Z47.89 ENCOUNTER FOR ORTHOPEDIC FOLLOW-UP CARE: Primary | ICD-10-CM

## 2024-04-02 PROCEDURE — 99212 OFFICE O/P EST SF 10 MIN: CPT | Mod: PBBFAC | Performed by: NURSE PRACTITIONER

## 2024-04-02 PROCEDURE — 99024 POSTOP FOLLOW-UP VISIT: CPT | Mod: ,,, | Performed by: NURSE PRACTITIONER

## 2024-04-02 RX ORDER — OXYCODONE AND ACETAMINOPHEN 10; 325 MG/1; MG/1
1 TABLET ORAL EVERY 6 HOURS PRN
Qty: 30 TABLET | Refills: 0 | Status: SHIPPED | OUTPATIENT
Start: 2024-04-02 | End: 2024-04-04 | Stop reason: SDUPTHER

## 2024-04-02 RX ORDER — ONDANSETRON 4 MG/1
4 TABLET, ORALLY DISINTEGRATING ORAL EVERY 8 HOURS PRN
Qty: 30 TABLET | Refills: 0 | Status: SHIPPED | OUTPATIENT
Start: 2024-04-02

## 2024-04-02 NOTE — PROGRESS NOTES
HISTORY OF PRESENT ILLNESS:       No surgery found No surgery found      Pt is here today for First post-operative followup of her No surgery found.  she is doing well.  We have reviewed her findings and discussed plan of care and future treatment options, including the physical therapy plan.    Patient is 2 weeks post op right elbow arthroscopy, doing well.  Her incision looks good today.  She has been in a long-arm splint for 2 weeks.  Splint removed today.  No signs of infection.  Sutures removed and Steri-Strips applied.  We did discuss wound care today.  We discuss therapy as well.  We will get that set up today.  She is requesting a refill on pain medication.                                                                                 PHYSICAL EXAMINATION:     Incision sites healed well  No evidence of any erythema, infection or induration  Minimal effusion  2+ radial pulse                                                                               ASSESSMENT:                                                                                                                                               1. Status post above, doing well.                                                                                                                               PLAN:       Wounds were treated today  DVT prophylaxis discussed  Therapy plan discussed in great detail today; all questions answered.                                                                       Will set up OP PT  RTC with Dr Pabon in 4 weeks  Discussed plan of care  Discontinue sling  NO lifiting. Work on elbow ROM                                                                        There are no Patient Instructions on file for this visit.

## 2024-04-03 ENCOUNTER — TELEPHONE (OUTPATIENT)
Dept: ORTHOPEDICS | Facility: CLINIC | Age: 49
End: 2024-04-03
Payer: OTHER GOVERNMENT

## 2024-04-03 NOTE — TELEPHONE ENCOUNTER
----- Message from Maggie Castañeda sent at 4/3/2024 10:39 AM CDT -----  [facility] [place] [facility] - call back # [phone]

## 2024-04-04 RX ORDER — OXYCODONE AND ACETAMINOPHEN 10; 325 MG/1; MG/1
1 TABLET ORAL EVERY 6 HOURS PRN
Qty: 30 TABLET | Refills: 0 | Status: SHIPPED | OUTPATIENT
Start: 2024-04-04

## 2024-05-01 ENCOUNTER — OFFICE VISIT (OUTPATIENT)
Dept: ORTHOPEDICS | Facility: CLINIC | Age: 49
End: 2024-05-01
Payer: OTHER GOVERNMENT

## 2024-05-01 DIAGNOSIS — M77.11 LATERAL EPICONDYLITIS, RIGHT ELBOW: Primary | ICD-10-CM

## 2024-05-01 PROCEDURE — 99212 OFFICE O/P EST SF 10 MIN: CPT | Mod: PBBFAC | Performed by: NURSE PRACTITIONER

## 2024-05-01 PROCEDURE — 99024 POSTOP FOLLOW-UP VISIT: CPT | Mod: ,,, | Performed by: NURSE PRACTITIONER

## 2024-05-01 RX ORDER — NAPROXEN 500 MG/1
500 TABLET ORAL 2 TIMES DAILY WITH MEALS
Qty: 60 TABLET | Refills: 0 | Status: SHIPPED | OUTPATIENT
Start: 2024-05-01

## 2024-05-01 RX ORDER — TRAMADOL HYDROCHLORIDE 50 MG/1
50 TABLET ORAL EVERY 6 HOURS PRN
Qty: 20 TABLET | Refills: 0 | Status: SHIPPED | OUTPATIENT
Start: 2024-05-01

## 2024-05-01 NOTE — PROGRESS NOTES
HISTORY OF PRESENT ILLNESS:       No surgery found No surgery found      Pt is here today for Second post-operative followup of her No surgery found.  she is doing well.  We have reviewed her findings and discussed plan of care and future treatment options, including the physical therapy plan.      Patient is 6 weeks postop right elbow arthroscopy with tendon repair, doing well.  Incision is well healed.  She was completed formal PT.  She is still having some tenderness over her elbow.  She is requesting a refill on pain medication today.                                                                               PHYSICAL EXAMINATION:     Incision sites healed well  No evidence of any erythema, infection or induration  Minimal effusion                                                                                 ASSESSMENT:                                                                                                                                               1. Status post above, doing well.                                                                                                                               PLAN:       Wounds were treated today  DVT prophylaxis discussed  Therapy plan discussed in great detail today; all questions answered.                                                                          Naproxen p.o. b.i.d..  Ultram 50 mg p.o. q.8 hours.  Discussed this will be her last prescription for pain medication.  Return to clinic 4 weeks with Dr. Pabon.  Continue exercises and stretching                                                                     There are no Patient Instructions on file for this visit.

## 2024-09-12 DIAGNOSIS — M77.11 LATERAL EPICONDYLITIS, RIGHT ELBOW: Primary | ICD-10-CM

## 2024-12-13 DIAGNOSIS — M77.11 RIGHT LATERAL EPICONDYLITIS: Primary | ICD-10-CM

## 2024-12-16 DIAGNOSIS — M25.521 RIGHT ELBOW PAIN: Primary | ICD-10-CM

## 2024-12-17 ENCOUNTER — OFFICE VISIT (OUTPATIENT)
Dept: ORTHOPEDICS | Facility: CLINIC | Age: 49
End: 2024-12-17
Payer: OTHER GOVERNMENT

## 2024-12-17 ENCOUNTER — HOSPITAL ENCOUNTER (OUTPATIENT)
Dept: RADIOLOGY | Facility: HOSPITAL | Age: 49
Discharge: HOME | End: 2024-12-17
Attending: ORTHOPAEDIC SURGERY
Payer: OTHER GOVERNMENT

## 2024-12-17 DIAGNOSIS — M77.11 LATERAL EPICONDYLITIS, RIGHT ELBOW: ICD-10-CM

## 2024-12-17 DIAGNOSIS — M77.11 RIGHT LATERAL EPICONDYLITIS: ICD-10-CM

## 2024-12-17 DIAGNOSIS — M25.521 RIGHT ELBOW PAIN: Primary | ICD-10-CM

## 2024-12-17 DIAGNOSIS — M25.521 RIGHT ELBOW PAIN: ICD-10-CM

## 2024-12-17 DIAGNOSIS — Z47.89 ENCOUNTER FOR ORTHOPEDIC FOLLOW-UP CARE: ICD-10-CM

## 2024-12-17 PROCEDURE — 99213 OFFICE O/P EST LOW 20 MIN: CPT | Mod: S$PBB,,, | Performed by: ORTHOPAEDIC SURGERY

## 2024-12-17 PROCEDURE — 99215 OFFICE O/P EST HI 40 MIN: CPT | Mod: PBBFAC,25 | Performed by: ORTHOPAEDIC SURGERY

## 2024-12-17 PROCEDURE — 73070 X-RAY EXAM OF ELBOW: CPT | Mod: TC,RT

## 2024-12-17 PROCEDURE — 99999 PR PBB SHADOW E&M-EST. PATIENT-LVL V: CPT | Mod: PBBFAC,,, | Performed by: ORTHOPAEDIC SURGERY

## 2024-12-20 NOTE — PROGRESS NOTES
49 y.o. Female returns to clinic for a follow up visit regarding     ICD-10-CM ICD-9-CM   1. Right elbow pain  M25.521 719.42   2. Encounter for orthopedic follow-up care  Z47.89 V54.9   3. Lateral epicondylitis, right elbow  M77.11 726.32   4. Right lateral epicondylitis  M77.11 726.32        Still complaining of right elbow pain       Past Medical History:   Diagnosis Date    Anxiety disorder, unspecified     Diabetes mellitus     Hypertension     Major depressive disorder, single episode, unspecified      Past Surgical History:   Procedure Laterality Date    APPENDECTOMY N/A     ARTHROSCOPY, ELBOW, WITH EXTENSIVE DEBRIDEMENT Right 3/18/2024    Procedure: ARTHROSCOPY, ELBOW, WITH EXTENSIVE DEBRIDEMENT;  Surgeon: Jhony Pabon MD;  Location: [facility];  Service: Orthopedics;  Laterality: Right;    HEEL SPUR SURGERY Right     LAPAROSCOPIC CHOLECYSTECTOMY N/A     RELEASE OF EXTENSOR CARPI RADIALIS BREVIS  3/18/2024    Procedure: REPAIR, EXTENSOR CARPI RADIALIS BREVIS;  Surgeon: Jhony Pabon MD;  Location: [facility];  Service: Orthopedics;;    REPAIR OF URETER      TENOTOMY, ELBOW, WITH DEBRIDEMENT AND TENDON REPAIR Right 9/29/2023    Procedure: TENOTOMY,ELBOW,WITH DEBRIDEMENT AND TENDON REPAIR;  Surgeon: Jhony Pabon MD;  Location: [facility];  Service: Orthopedics;  Laterality: Right;    TOTAL ABDOMINAL HYSTERECTOMY W/ BILATERAL SALPINGOOPHORECTOMY      TRIGGER FINGER RELEASE Right 9/29/2023    Procedure: RELEASE, TRIGGER FINGER;  Surgeon: Jhony Pabon MD;  Location: [facility];  Service: Orthopedics;  Laterality: Right;         PHYSICAL EXAMINATION:    General    Nursing note and vitals reviewed.  Constitutional: She is oriented to person, place, and time. She appears well-developed and well-nourished.   HENT:   Head: Normocephalic and atraumatic.   Nose: Nose normal.   Eyes: EOM are normal. Pupils are equal, round, and reactive to light.   Neck: Neck supple.   Cardiovascular:   Normal rate and intact distal pulses.            Pulmonary/Chest: Effort normal. No respiratory distress. She exhibits no tenderness.   Abdominal: Soft. She exhibits no distension. There is no abdominal tenderness.   Neurological: She is alert and oriented to person, place, and time. She has normal reflexes.   Psychiatric: She has a normal mood and affect. Her behavior is normal. Judgment and thought content normal.             Right Hand/Wrist Exam     Inspection   Scars: Wrist - absent     Range of Motion     Wrist   Extension:  normal   Flexion:  normal     Other     Neuorologic Exam    Median Distribution: normal  Ulnar Distribution: normal  Radial Distribution: normal      Right Elbow Exam     Inspection   Scars: absent  Bruising: absent  Atrophy: absent    Pain   The patient exhibits pain of the extensor musculature and lateral epicondyle    Range of Motion   Extension:  normal   Flexion:  normal   Pronation:  normal   Supination:  normal     Tests   Varus: negative  Valgus: negative  Tennis Elbow: severe  Golfer's Elbow: negative  Radial Capitellar Grind: negative    Other   Sensation: normal          Muscle Strength   Right Upper Extremity   Wrist extension: 4/5   Wrist flexion: 5/5   Elbow Pronation:  5/5   Elbow Supination:  5/5   Elbow Extension: 5/5  Elbow Flexion: 5/5        IMAGING:  X-Ray Elbow 2 Views Right    Result Date: 12/17/2024  See Procedure Notes for results. IMPRESSION: Please see Ortho procedure notes for report.  This procedure was auto-finalized by: [facility]       ASSESSMENT:      ICD-10-CM ICD-9-CM   1. Right elbow pain  M25.521 719.42   2. Encounter for orthopedic follow-up care  Z47.89 V54.9   3. Lateral epicondylitis, right elbow  M77.11 726.32   4. Right lateral epicondylitis  M77.11 726.32       PLAN:     -Findings and treatment options were discussed with the patient  -All questions answered    Still complaining of elbow pain.  Had to have a revision ECRB repair    Id  like to obtain an MRI and see back.     There are no Patient Instructions on file for this visit.      Orders Placed This Encounter   Procedures    MRI Elbow Joint Without Contrast Right         Procedures

## 2025-06-04 DIAGNOSIS — M65.30 TRIGGER FINGER: Primary | ICD-10-CM

## 2025-07-01 ENCOUNTER — OFFICE VISIT (OUTPATIENT)
Dept: ORTHOPEDICS | Facility: CLINIC | Age: 50
End: 2025-07-01
Payer: OTHER GOVERNMENT

## 2025-07-01 VITALS
DIASTOLIC BLOOD PRESSURE: 101 MMHG | OXYGEN SATURATION: 98 % | SYSTOLIC BLOOD PRESSURE: 177 MMHG | BODY MASS INDEX: 38.37 KG/M2 | HEART RATE: 89 BPM | WEIGHT: 268 LBS | HEIGHT: 70 IN

## 2025-07-01 DIAGNOSIS — M65.30 TRIGGER FINGER: ICD-10-CM

## 2025-07-01 PROCEDURE — 99215 OFFICE O/P EST HI 40 MIN: CPT | Mod: PBBFAC | Performed by: NURSE PRACTITIONER

## 2025-07-01 PROCEDURE — 99999 PR PBB SHADOW E&M-EST. PATIENT-LVL V: CPT | Mod: PBBFAC,,, | Performed by: NURSE PRACTITIONER

## 2025-07-01 PROCEDURE — 99999PBSHW PR PBB SHADOW TECHNICAL ONLY FILED TO HB: Mod: PBBFAC,,,

## 2025-07-01 PROCEDURE — 20550 NJX 1 TENDON SHEATH/LIGAMENT: CPT | Mod: PBBFAC,RT | Performed by: NURSE PRACTITIONER

## 2025-07-01 RX ADMIN — TRIAMCINOLONE ACETONIDE 20 MG: 40 INJECTION, SUSPENSION INTRA-ARTICULAR; INTRAMUSCULAR at 02:07

## 2025-07-01 NOTE — PROGRESS NOTES
49 y.o. Female returns to clinic for a follow up visit regarding     ICD-10-CM ICD-9-CM   1. Trigger finger  M65.30 727.03        She states that her right ring finger started locking up again over the last 3 months.   She had a previous release by Dr. Jhony Pabon September 2023.  Pains of tenderness over A1 pulley flexor tendon area.  Reports it has done good until the last 3 months.       Past Medical History:   Diagnosis Date    Anxiety disorder, unspecified     Diabetes mellitus     Hypertension     Major depressive disorder, single episode, unspecified      Past Surgical History:   Procedure Laterality Date    APPENDECTOMY N/A     ARTHROSCOPY, ELBOW, WITH EXTENSIVE DEBRIDEMENT Right 3/18/2024    Procedure: ARTHROSCOPY, ELBOW, WITH EXTENSIVE DEBRIDEMENT;  Surgeon: Jhony Pabon MD;  Location: [facility];  Service: Orthopedics;  Laterality: Right;    HEEL SPUR SURGERY Right     LAPAROSCOPIC CHOLECYSTECTOMY N/A     RELEASE OF EXTENSOR CARPI RADIALIS BREVIS  3/18/2024    Procedure: REPAIR, EXTENSOR CARPI RADIALIS BREVIS;  Surgeon: Jhony Pabon MD;  Location: [facility];  Service: Orthopedics;;    REPAIR OF URETER      TENOTOMY, ELBOW, WITH DEBRIDEMENT AND TENDON REPAIR Right 9/29/2023    Procedure: TENOTOMY,ELBOW,WITH DEBRIDEMENT AND TENDON REPAIR;  Surgeon: Jhony Pabon MD;  Location: [facility];  Service: Orthopedics;  Laterality: Right;    TOTAL ABDOMINAL HYSTERECTOMY W/ BILATERAL SALPINGOOPHORECTOMY      TRIGGER FINGER RELEASE Right 9/29/2023    Procedure: RELEASE, TRIGGER FINGER;  Surgeon: Jhony Pabon MD;  Location: [facility];  Service: Orthopedics;  Laterality: Right;         PHYSICAL EXAMINATION:    Ortho/SPM Exam  Catching noted at right ring finger.  Tenderness over A1 pulley area    IMAGING:  No results found.     ASSESSMENT:      ICD-10-CM ICD-9-CM   1. Trigger finger  M65.30 727.03       PLAN:     -Findings and treatment options were discussed with the  patient  -All questions answered      Right ring finger injection today.  Patient instructed if injection does not work well or she requires additional surgery she will have to return to clinic with Dr. Pabon    There are no Patient Instructions on file for this visit.      No orders of the defined types were placed in this encounter.        Tendon Sheath    Date/Time: 7/1/2025 2:00 PM    Performed by: Gretchen Garrido FNP  Authorized by: Gretchen Garrido FNP    Consent Done?:  Yes (Verbal)  Indications:  Pain  Local anesthetic:  Bupivacaine 0.25% without epinephrine  Location:  Ring finger  Site:  R ring flexor tendon sheath  Needle size:  22 G  Approach:  Volar  Medications:  20 mg triamcinolone acetonide 40 mg/mL  Patient tolerance:  Patient tolerated the procedure well with no immediate complications

## 2025-07-01 NOTE — LETTER
July 1, 2025      [facility]  [address] [place] [id number]  Phone: [phone]  Fax: [phone]       Patient: Arti Kaur   YOB: 1975  Date of Visit: 07/01/2025    To Whom It May Concern:    SAMMY Kaur  was at [facility] on 07/01/2025. Please excuse her sone, Ya Kaur, from work as he had to provide transportation to the appointment. If you have any questions or concerns, or if I can be of further assistance, please do not hesitate to contact me.    Sincerely,    TAMAR Golden

## 2025-07-03 RX ORDER — TRIAMCINOLONE ACETONIDE 40 MG/ML
20 INJECTION, SUSPENSION INTRA-ARTICULAR; INTRAMUSCULAR
Status: DISCONTINUED | OUTPATIENT
Start: 2025-07-01 | End: 2025-07-03 | Stop reason: HOSPADM

## (undated) DEVICE — DRAPE INCISE IOBAN 2 13X13IN

## (undated) DEVICE — DRAPE INVISISHIELD U 48X52IN

## (undated) DEVICE — GLOVE SENSICARE PI GRN 7.5

## (undated) DEVICE — DRESSING XEROFORM NONADH 1X8IN

## (undated) DEVICE — SUT 2/0 36IN COATED VICRYL

## (undated) DEVICE — SYR 10CC LUER LOCK

## (undated) DEVICE — GLOVE 7.5 PROTEXIS PI BLUE

## (undated) DEVICE — SUT CTD VICRYL 0 UND BR CT

## (undated) DEVICE — BLADE SURG #15 CARBON STEEL

## (undated) DEVICE — SOCKINETTE DOUBLE PLY 4X48IN

## (undated) DEVICE — TIP YANKAUERS BULB NO VENT

## (undated) DEVICE — SOL NACL IRR 1000ML BTL

## (undated) DEVICE — SUT ETHILON 3-0

## (undated) DEVICE — SUT PDS II 1 CT VIL MONO 36

## (undated) DEVICE — PAD CAST SPECIALIST STRL 3

## (undated) DEVICE — DRAPE U SPLIT SHEET 54X76IN

## (undated) DEVICE — APPLICATOR CHLORAPREP ORN 26ML

## (undated) DEVICE — BLADE SURG CARBON STEEL SZ11

## (undated) DEVICE — BANDAGE ESMARK 4INX3YD

## (undated) DEVICE — Device

## (undated) DEVICE — SPONGE COTTON TRAY 4X4IN

## (undated) DEVICE — NDL HYPODERMIC SAFETY 25G 1IN

## (undated) DEVICE — SUT MONOCRYL 3-0 PS-2 UND

## (undated) DEVICE — SUT 4-0 VICRYL / FS-2

## (undated) DEVICE — GLOVE BIOGEL SKINSENSE PI 7.5

## (undated) DEVICE — GLOVE SENSICARE PI GRN 8.5

## (undated) DEVICE — SUT ETHILON 4-0 PS2 18 BLK

## (undated) DEVICE — SLING ARM LARGE FOAM STRAP

## (undated) DEVICE — SUT BLU BR 2 TAPERD NDL 1/2

## (undated) DEVICE — PADDING CAST SYN STRL 4INX4YD

## (undated) DEVICE — ADHESIVE MASTISOL VIAL 48/BX

## (undated) DEVICE — TOURNIQUET SB QC SP 18X4IN

## (undated) DEVICE — STRIP STERI REIN CLSR 1/2X2IN

## (undated) DEVICE — GLOVE SENSICARE PI SURG 7.5

## (undated) DEVICE — SUT MONO 2-0 CT-1 UNDYED

## (undated) DEVICE — SUT VCRL + COAT 2 NDL 2-0 27IN

## (undated) DEVICE — PAD ABDOMINAL 8X7.5 STERILE

## (undated) DEVICE — GOWN POLY REINF BRTH SLV XL

## (undated) DEVICE — GLOVE 7.0 PROTEXIS PI BLUE

## (undated) DEVICE — GLOVE BIOGEL SKINSENSE PI 7.0

## (undated) DEVICE — DRESSING XEROFORM 5X9IN

## (undated) DEVICE — HANDLE MEDI-VAC YANKAUER STR